# Patient Record
Sex: FEMALE | Race: OTHER | HISPANIC OR LATINO
[De-identification: names, ages, dates, MRNs, and addresses within clinical notes are randomized per-mention and may not be internally consistent; named-entity substitution may affect disease eponyms.]

---

## 2018-12-26 ENCOUNTER — ASOB RESULT (OUTPATIENT)
Age: 43
End: 2018-12-26

## 2018-12-26 ENCOUNTER — APPOINTMENT (OUTPATIENT)
Dept: ANTEPARTUM | Facility: CLINIC | Age: 43
End: 2018-12-26
Payer: MEDICAID

## 2018-12-26 PROBLEM — Z00.00 ENCOUNTER FOR PREVENTIVE HEALTH EXAMINATION: Status: ACTIVE | Noted: 2018-12-26

## 2018-12-26 PROCEDURE — 76801 OB US < 14 WKS SINGLE FETUS: CPT

## 2019-03-13 ENCOUNTER — APPOINTMENT (OUTPATIENT)
Dept: ANTEPARTUM | Facility: CLINIC | Age: 44
End: 2019-03-13
Payer: MEDICAID

## 2019-03-13 ENCOUNTER — ASOB RESULT (OUTPATIENT)
Age: 44
End: 2019-03-13

## 2019-03-13 PROCEDURE — 76817 TRANSVAGINAL US OBSTETRIC: CPT

## 2019-03-13 PROCEDURE — 76811 OB US DETAILED SNGL FETUS: CPT

## 2019-03-29 ENCOUNTER — APPOINTMENT (OUTPATIENT)
Dept: ANTEPARTUM | Facility: CLINIC | Age: 44
End: 2019-03-29
Payer: MEDICAID

## 2019-03-29 ENCOUNTER — ASOB RESULT (OUTPATIENT)
Age: 44
End: 2019-03-29

## 2019-03-29 PROCEDURE — 76816 OB US FOLLOW-UP PER FETUS: CPT

## 2019-04-12 ENCOUNTER — APPOINTMENT (OUTPATIENT)
Dept: PEDIATRIC CARDIOLOGY | Facility: CLINIC | Age: 44
End: 2019-04-12
Payer: MEDICAID

## 2019-04-12 DIAGNOSIS — Q99.9 CHROMOSOMAL ABNORMALITY, UNSPECIFIED: ICD-10-CM

## 2019-04-12 PROCEDURE — 76825 ECHO EXAM OF FETAL HEART: CPT

## 2019-04-12 PROCEDURE — 76827 ECHO EXAM OF FETAL HEART: CPT

## 2019-04-12 PROCEDURE — 93325 DOPPLER ECHO COLOR FLOW MAPG: CPT | Mod: 59

## 2019-04-12 PROCEDURE — 99204 OFFICE O/P NEW MOD 45 MIN: CPT | Mod: 25

## 2019-04-12 RX ORDER — PNV/FERROUS SULFATE/FOLIC ACID 27-<0.5MG
TABLET ORAL
Refills: 0 | Status: ACTIVE | COMMUNITY

## 2019-04-15 PROBLEM — Q99.9 CHROMOSOMAL ABNORMALITY: Status: ACTIVE | Noted: 2019-03-29

## 2019-05-10 ENCOUNTER — APPOINTMENT (OUTPATIENT)
Age: 44
End: 2019-05-10
Payer: MEDICAID

## 2019-05-10 ENCOUNTER — ASOB RESULT (OUTPATIENT)
Age: 44
End: 2019-05-10

## 2019-05-10 PROCEDURE — 76816 OB US FOLLOW-UP PER FETUS: CPT

## 2019-05-29 ENCOUNTER — APPOINTMENT (OUTPATIENT)
Dept: MATERNAL FETAL MEDICINE | Facility: CLINIC | Age: 44
End: 2019-05-29
Payer: MEDICAID

## 2019-05-29 ENCOUNTER — APPOINTMENT (OUTPATIENT)
Dept: ANTEPARTUM | Facility: CLINIC | Age: 44
End: 2019-05-29

## 2019-05-29 VITALS
HEART RATE: 92 BPM | OXYGEN SATURATION: 97 % | SYSTOLIC BLOOD PRESSURE: 138 MMHG | HEIGHT: 65 IN | DIASTOLIC BLOOD PRESSURE: 80 MMHG | BODY MASS INDEX: 37.7 KG/M2 | WEIGHT: 226.25 LBS | RESPIRATION RATE: 18 BRPM

## 2019-05-29 DIAGNOSIS — Z86.69 PERSONAL HISTORY OF OTHER DISEASES OF THE NERVOUS SYSTEM AND SENSE ORGANS: ICD-10-CM

## 2019-05-29 DIAGNOSIS — E66.9 OBESITY, UNSPECIFIED: ICD-10-CM

## 2019-05-29 DIAGNOSIS — O35.9XX0 MATERNAL CARE FOR (SUSPECTED) FETAL ABNORMALITY AND DAMAGE, UNSPECIFIED, NOT APPLICABLE OR UNSPECIFIED: ICD-10-CM

## 2019-05-29 DIAGNOSIS — D25.9 MATERNAL CARE FOR BENIGN TUMOR OF CORPUS UTERI, THIRD TRIMESTER: ICD-10-CM

## 2019-05-29 DIAGNOSIS — O34.13 MATERNAL CARE FOR BENIGN TUMOR OF CORPUS UTERI, THIRD TRIMESTER: ICD-10-CM

## 2019-05-29 DIAGNOSIS — Z3A.31 31 WEEKS GESTATION OF PREGNANCY: ICD-10-CM

## 2019-05-29 DIAGNOSIS — O99.213 OBESITY COMPLICATING PREGNANCY, THIRD TRIMESTER: ICD-10-CM

## 2019-05-29 DIAGNOSIS — Z87.19 PERSONAL HISTORY OF OTHER DISEASES OF THE DIGESTIVE SYSTEM: ICD-10-CM

## 2019-05-29 DIAGNOSIS — Z83.79 FAMILY HISTORY OF OTHER DISEASES OF THE DIGESTIVE SYSTEM: ICD-10-CM

## 2019-05-29 DIAGNOSIS — O09.523 SUPERVISION OF ELDERLY MULTIGRAVIDA, THIRD TRIMESTER: ICD-10-CM

## 2019-05-29 PROCEDURE — 99215 OFFICE O/P EST HI 40 MIN: CPT

## 2019-05-29 RX ORDER — OMEPRAZOLE 20 MG/1
20 CAPSULE, DELAYED RELEASE ORAL
Refills: 0 | Status: ACTIVE | COMMUNITY

## 2019-05-29 NOTE — FAMILY HISTORY
[Age 35+ During Pregnancy] : not 35 or over during pregnancy [Reported Family History Of Birth Defects] : no congenital heart defects [Sagar-Sachs Carrier] : no Sagar-Sachs [Family History] : no mental retardation/autism [Reported Family History Of Genetic Disease] : no maternal metabolic disorder

## 2019-05-29 NOTE — ACTIVE PROBLEMS
[Heart Disease] : no heart disease [Hypertension] : no hypertension [Diabetes Mellitus] : no diabetes mellitus [Renal Disease] : no kidney disease, no UTI [Autoimmune Disease] : no autoimmune disease [Neurologic Disorder] : no neurologic disorder, no epilepsy [Hepatic Disorder] : no hepatitis, no liver disease [Depression] : no depression, no post partum depression [Psychiatric Disorders] : no psychiatric disorders [Thrombophlebitis] : no varicosities, no phlebitis [Thyroid Disorder] : no thyroid dysfunction [Trauma] : no trauma/violence [Blood Transfusion (___ Ml)] : no history of blood transfusion

## 2019-05-29 NOTE — PAST MEDICAL HISTORY
[HIV Infection] : no HIV [Herpes Simplex] : no genital herpes [Syphilis] : no syphilis [Chlamydial Infections] : no chlamydia [Exposure To Gonorrhea] : no gonorrhea [Hepatitis, B Virus] : no Hepatitis B [Human Papilloma Virus Infection] : no genital warts [Hepatitis, C Virus] : no Hepatitis C [Trichomoniasis] : no trichomoniasis

## 2019-05-29 NOTE — DISCUSSION/SUMMARY
[FreeTextEntry1] : She is 31 weeks and 3 days gestation by early ultrasound dating.\par \par She has a  BMI > 35.0 and obesity has been associated with a number of maternal complications such as gestational diabetes, pre-eclampsia, thrombophlebitis, labor abnormalities, post-term pregnancies,  delivery, and operative complications. Obesity has been associated with adverse fetal outcomes such as late stillbirth and  deliveries.  Obese women also have a two to three-fold increased incidence in congenital anomalies. There were no major fetal malformations seen during the fetal anatomy ultrasound examination. A fetal echocardiogram was done on 2019 and reported normal fetal cardiac anatomy.\par \par Regarding her gestational diabetes, she has been trying to follow the recommended diabetic diet. She performs fasting and 2 hour postprandial self glucose monitoring. She was advised to stop doing two-hour postprandial glucose determination and to start doing a one hour postprandial glucose determination from the beginning of the meal. My review of her log book from 5/15/19 to 19 revealed 5/13 (39%) elevated fasting glucose values and 19/32 (61%). She appears to be in suboptimal glycemic control. I believed the reason for her poor glycemic control at this time is due to non-adherence to a diabetic diet. I informed her that maintaining euglycemia is the most important factor associated with good  outcomes in pregnancies complicated by gestational diabetes. I told her that poor glucose control may cause fetal macrosomia, shoulder dystocia,  delivery,  respiratory distress syndrome and  metabolic complications such as hypoglycemia and hyperbilirubinemia.  I told her to eat three daily meals with 3 snacks to reduce postprandial glucose fluctuations. I told her that she is at risk for developing gestational hypertension and preeclampsia during the current pregnancy. I also told her that she is at risk for developing type 2 diabetes, metabolic syndrome and cardiovascular disease later in life. I also recommend a 75 gram 2 hour OGTT approximately 6 - 8 weeks postpartum to determine whether she has impaired glucose tolerance or preexisting diabetes not diagnosed prior to the pregnancy. I believe that she needs more nutritional counseling. I made arrangements for her to see our diabetes educator in approximately one week. I ordered a hemoglobin A1c level. She was also scheduled to have a follow up ultrasound examination in approximally one week to evaluate fetal growth.\par \par Regarding her advanced maternal age, she did not have genetic counseling. She told me that she had prenatal screen testing that reported a low risk for fetal chromosomal malformations. She had a fetal anatomy ultrasound examination on 2019 that reported no fetal malformations. She had a fetal echocardiogram on 2019 reported normal fetal cardiac anatomy.\par \par I told her that advanced maternal age has been associated with a higher incidence of gestational diabetes, and preeclampsia /eclampsia. She should be closely monitored for these conditions during pregnancy.  I also told her that advanced maternal age has been associated with an increased risk of stillbirth, and therefore, I recommend delivery during the 39 week of gestation in the event she has not given birth by 39 weeks of gestation. \par \par A small uterine fibroid (2.7 cm) was noted during her ultrasound examinations. I told her that growth of fibroids during pregnancy cannot be predicted. During the third trimester of pregnancy, fibroids usually remain unchanged or decrease in size.  I suggest having serial ultrasounds during pregnancy. The incidence of  delivery is increased in women with a fibroid uterus.  I also told her that uterine fibroids can also degenerate during pregnancy and cause acute abdominal pain, low-grade fever, and leukocytosis.  Management of uterine fibroid degeneration involves the use of analgesics and observation for  labor.   \par \par Her blood pressure today in the office was slightly elevated at 138/80. She denies having headaches epigastric pain and visual disturbances. She stated that 3 days ago she had a headache. She told me that she was diagnosed with migraine headaches approximately 10 years ago. I ordered the usual preeclampsia laboratory tests.\par \par I recommend the Tdap vaccine between 27 and 36 weeks of gestation to prevent pertussis infection in the  infant. She should have serial ultrasounds to evaluate fetal growth and development.  I also suggest fetal surveillance during the third trimester of pregnancy with weekly NSTs or BPPs starting at 36 weeks gestation.  She can also perform daily fetal movement counts as an adjunct to the NSTs or BPPs.\par

## 2019-05-29 NOTE — VITALS
[US Date: ___] : ultrasound performed on [unfilled]. [GA= ___ Weeks] : Results were GA of [unfilled] weeks [GA= ___ Days] : and [unfilled] day(s) [TAIWO by US (date): ___] : The calculated TAIWO by US is [unfilled] [By US] : this is the final TAIWO

## 2019-05-29 NOTE — SURGICAL HISTORY
[Last Pap: ___] : Last Pap: [unfilled] [Fibroids] : no fibroids [Abn Paps] : no abnormal pap smears [Breast Disease] : no breast disease [Cysts] : no cysts [STI's] : no STI's [Infertility] : no infertility [Last Mammo: ___] : Last Mammo: none [OC Use] : no OC use

## 2019-05-29 NOTE — OB HISTORY
[Spontaneous] : Spontaneous conception [Sonogram] : sonogram [Definite:  ___ (Date)] : the last menstrual period was [unfilled] [at ___ wks] : at [unfilled] weeks [Pregnancy History] : patient did not receive anesthesia [___] : no pregnancy complications reported [FreeTextEntry1] : Prenatal records were not available for my review.\par \par She had a one-hour Glucola challenge tests on April 26, 2019 reported a glucose of 180. She had a three-hour glucose tolerance test on May 4, 2019 that reported a fasting glucose of 102, one hour glucose of 200, 2 hour glucose of 199, and three-hour glucose of 175. She was diagnosed with gestational diabetes based on the three-hour GTT glucose results.\par \par

## 2019-06-07 ENCOUNTER — ASOB RESULT (OUTPATIENT)
Age: 44
End: 2019-06-07

## 2019-06-07 ENCOUNTER — APPOINTMENT (OUTPATIENT)
Dept: ANTEPARTUM | Facility: CLINIC | Age: 44
End: 2019-06-07
Payer: MEDICAID

## 2019-06-07 ENCOUNTER — APPOINTMENT (OUTPATIENT)
Dept: MATERNAL FETAL MEDICINE | Facility: CLINIC | Age: 44
End: 2019-06-07
Payer: MEDICAID

## 2019-06-07 VITALS — HEIGHT: 65 IN | BODY MASS INDEX: 37.51 KG/M2 | WEIGHT: 225.13 LBS

## 2019-06-07 PROCEDURE — 76819 FETAL BIOPHYS PROFIL W/O NST: CPT

## 2019-06-07 PROCEDURE — 76816 OB US FOLLOW-UP PER FETUS: CPT

## 2019-06-07 PROCEDURE — G0108 DIAB MANAGE TRN  PER INDIV: CPT

## 2019-06-14 ENCOUNTER — ASOB RESULT (OUTPATIENT)
Age: 44
End: 2019-06-14

## 2019-06-14 ENCOUNTER — APPOINTMENT (OUTPATIENT)
Dept: MATERNAL FETAL MEDICINE | Facility: CLINIC | Age: 44
End: 2019-06-14
Payer: MEDICAID

## 2019-06-14 VITALS — BODY MASS INDEX: 37.84 KG/M2 | WEIGHT: 227.13 LBS | HEIGHT: 65 IN

## 2019-06-14 DIAGNOSIS — O24.410 GESTATIONAL DIABETES MELLITUS IN PREGNANCY, DIET CONTROLLED: ICD-10-CM

## 2019-06-14 DIAGNOSIS — O24.419 GESTATIONAL DIABETES MELLITUS IN PREGNANCY, UNSPECIFIED CONTROL: ICD-10-CM

## 2019-06-14 PROCEDURE — G0108 DIAB MANAGE TRN  PER INDIV: CPT

## 2019-06-14 RX ORDER — METFORMIN HYDROCHLORIDE 500 MG/1
500 TABLET, COATED ORAL
Qty: 1 | Refills: 1 | Status: ACTIVE | COMMUNITY
Start: 2019-06-14 | End: 1900-01-01

## 2019-06-17 LAB
ALBUMIN SERPL ELPH-MCNC: 3.1 G/DL
ALP BLD-CCNC: 202 U/L
ALT SERPL-CCNC: 13 U/L
AST SERPL-CCNC: 15 U/L
BILIRUB DIRECT SERPL-MCNC: 0.1 MG/DL
BILIRUB INDIRECT SERPL-MCNC: 0.2 MG/DL
BILIRUB SERPL-MCNC: 0.3 MG/DL
ESTIMATED AVERAGE GLUCOSE: 140 MG/DL
HBA1C MFR BLD HPLC: 6.5 %
PROT SERPL-MCNC: 5.9 G/DL

## 2019-06-21 ENCOUNTER — APPOINTMENT (OUTPATIENT)
Dept: ANTEPARTUM | Facility: CLINIC | Age: 44
End: 2019-06-21
Payer: MEDICAID

## 2019-06-21 ENCOUNTER — ASOB RESULT (OUTPATIENT)
Age: 44
End: 2019-06-21

## 2019-06-21 PROCEDURE — 76816 OB US FOLLOW-UP PER FETUS: CPT

## 2019-06-21 PROCEDURE — 76818 FETAL BIOPHYS PROFILE W/NST: CPT

## 2019-06-25 ENCOUNTER — TRANSCRIPTION ENCOUNTER (OUTPATIENT)
Age: 44
End: 2019-06-25

## 2019-06-25 ENCOUNTER — INPATIENT (INPATIENT)
Facility: HOSPITAL | Age: 44
LOS: 4 days | Discharge: HOME | End: 2019-06-30
Attending: SPECIALIST | Admitting: SPECIALIST
Payer: COMMERCIAL

## 2019-06-25 VITALS
TEMPERATURE: 99 F | HEART RATE: 89 BPM | DIASTOLIC BLOOD PRESSURE: 84 MMHG | SYSTOLIC BLOOD PRESSURE: 142 MMHG | RESPIRATION RATE: 16 BRPM

## 2019-06-25 DIAGNOSIS — O47.02 FALSE LABOR BEFORE 37 COMPLETED WEEKS OF GESTATION, SECOND TRIMESTER: ICD-10-CM

## 2019-06-25 DIAGNOSIS — O26.893 OTHER SPECIFIED PREGNANCY RELATED CONDITIONS, THIRD TRIMESTER: ICD-10-CM

## 2019-06-25 LAB
ALBUMIN SERPL ELPH-MCNC: 3.1 G/DL — LOW (ref 3.3–5.2)
ALP SERPL-CCNC: 239 U/L — HIGH (ref 40–120)
ALT FLD-CCNC: 11 U/L — SIGNIFICANT CHANGE UP
ANION GAP SERPL CALC-SCNC: 15 MMOL/L — SIGNIFICANT CHANGE UP (ref 5–17)
APPEARANCE UR: CLEAR — SIGNIFICANT CHANGE UP
APTT BLD: 28.3 SEC — SIGNIFICANT CHANGE UP (ref 27.5–36.3)
AST SERPL-CCNC: 17 U/L — SIGNIFICANT CHANGE UP
BACTERIA # UR AUTO: ABNORMAL
BASOPHILS # BLD AUTO: 0.05 K/UL — SIGNIFICANT CHANGE UP (ref 0–0.2)
BASOPHILS NFR BLD AUTO: 0.5 % — SIGNIFICANT CHANGE UP (ref 0–2)
BILIRUB SERPL-MCNC: 0.4 MG/DL — SIGNIFICANT CHANGE UP (ref 0.4–2)
BILIRUB UR-MCNC: NEGATIVE — SIGNIFICANT CHANGE UP
BLD GP AB SCN SERPL QL: SIGNIFICANT CHANGE UP
BUN SERPL-MCNC: 12 MG/DL — SIGNIFICANT CHANGE UP (ref 8–20)
CALCIUM SERPL-MCNC: 9.8 MG/DL — SIGNIFICANT CHANGE UP (ref 8.6–10.2)
CHLORIDE SERPL-SCNC: 104 MMOL/L — SIGNIFICANT CHANGE UP (ref 98–107)
CO2 SERPL-SCNC: 18 MMOL/L — LOW (ref 22–29)
COLOR SPEC: YELLOW — SIGNIFICANT CHANGE UP
CREAT ?TM UR-MCNC: 67 MG/DL — SIGNIFICANT CHANGE UP
CREAT SERPL-MCNC: 0.85 MG/DL — SIGNIFICANT CHANGE UP (ref 0.5–1.3)
DIFF PNL FLD: ABNORMAL
EOSINOPHIL # BLD AUTO: 0.28 K/UL — SIGNIFICANT CHANGE UP (ref 0–0.5)
EOSINOPHIL NFR BLD AUTO: 3.1 % — SIGNIFICANT CHANGE UP (ref 0–6)
EPI CELLS # UR: SIGNIFICANT CHANGE UP
FIBRINOGEN PPP-MCNC: 802 MG/DL — CRITICAL HIGH (ref 350–510)
GLUCOSE SERPL-MCNC: 86 MG/DL — SIGNIFICANT CHANGE UP (ref 70–115)
GLUCOSE UR QL: NEGATIVE MG/DL — SIGNIFICANT CHANGE UP
HCT VFR BLD CALC: 35.5 % — SIGNIFICANT CHANGE UP (ref 34.5–45)
HGB BLD-MCNC: 11.6 G/DL — SIGNIFICANT CHANGE UP (ref 11.5–15.5)
HIV 1 & 2 AB SERPL IA.RAPID: SIGNIFICANT CHANGE UP
HYALINE CASTS # UR AUTO: ABNORMAL /LPF
IMM GRANULOCYTES NFR BLD AUTO: 1 % — SIGNIFICANT CHANGE UP (ref 0–1.5)
INR BLD: 0.91 RATIO — SIGNIFICANT CHANGE UP (ref 0.88–1.16)
KETONES UR-MCNC: NEGATIVE — SIGNIFICANT CHANGE UP
LDH SERPL L TO P-CCNC: 233 U/L — HIGH (ref 98–192)
LEUKOCYTE ESTERASE UR-ACNC: NEGATIVE — SIGNIFICANT CHANGE UP
LYMPHOCYTES # BLD AUTO: 2.78 K/UL — SIGNIFICANT CHANGE UP (ref 1–3.3)
LYMPHOCYTES # BLD AUTO: 30.4 % — SIGNIFICANT CHANGE UP (ref 13–44)
MCHC RBC-ENTMCNC: 27.4 PG — SIGNIFICANT CHANGE UP (ref 27–34)
MCHC RBC-ENTMCNC: 32.7 GM/DL — SIGNIFICANT CHANGE UP (ref 32–36)
MCV RBC AUTO: 83.9 FL — SIGNIFICANT CHANGE UP (ref 80–100)
MONOCYTES # BLD AUTO: 0.68 K/UL — SIGNIFICANT CHANGE UP (ref 0–0.9)
MONOCYTES NFR BLD AUTO: 7.4 % — SIGNIFICANT CHANGE UP (ref 2–14)
NEUTROPHILS # BLD AUTO: 5.25 K/UL — SIGNIFICANT CHANGE UP (ref 1.8–7.4)
NEUTROPHILS NFR BLD AUTO: 57.6 % — SIGNIFICANT CHANGE UP (ref 43–77)
NITRITE UR-MCNC: NEGATIVE — SIGNIFICANT CHANGE UP
PH UR: 6 — SIGNIFICANT CHANGE UP (ref 5–8)
PLATELET # BLD AUTO: 186 K/UL — SIGNIFICANT CHANGE UP (ref 150–400)
POTASSIUM SERPL-MCNC: 4 MMOL/L — SIGNIFICANT CHANGE UP (ref 3.5–5.3)
POTASSIUM SERPL-SCNC: 4 MMOL/L — SIGNIFICANT CHANGE UP (ref 3.5–5.3)
PROT ?TM UR-MCNC: 221 MG/DL — HIGH (ref 0–12)
PROT SERPL-MCNC: 7 G/DL — SIGNIFICANT CHANGE UP (ref 6.6–8.7)
PROT UR-MCNC: 100 MG/DL
PROT/CREAT UR-RTO: 3.3 RATIO — HIGH
PROTHROM AB SERPL-ACNC: 10.4 SEC — SIGNIFICANT CHANGE UP (ref 10–12.9)
RBC # BLD: 4.23 M/UL — SIGNIFICANT CHANGE UP (ref 3.8–5.2)
RBC # FLD: 13.7 % — SIGNIFICANT CHANGE UP (ref 10.3–14.5)
RBC CASTS # UR COMP ASSIST: SIGNIFICANT CHANGE UP /HPF (ref 0–4)
SODIUM SERPL-SCNC: 137 MMOL/L — SIGNIFICANT CHANGE UP (ref 135–145)
SP GR SPEC: 1.01 — SIGNIFICANT CHANGE UP (ref 1.01–1.02)
TYPE + AB SCN PNL BLD: SIGNIFICANT CHANGE UP
URATE SERPL-MCNC: 6.3 MG/DL — HIGH (ref 2.4–5.7)
UROBILINOGEN FLD QL: NEGATIVE MG/DL — SIGNIFICANT CHANGE UP
WBC # BLD: 9.13 K/UL — SIGNIFICANT CHANGE UP (ref 3.8–10.5)
WBC # FLD AUTO: 9.13 K/UL — SIGNIFICANT CHANGE UP (ref 3.8–10.5)
WBC UR QL: SIGNIFICANT CHANGE UP

## 2019-06-25 RX ORDER — AMPICILLIN TRIHYDRATE 250 MG
2 CAPSULE ORAL ONCE
Refills: 0 | Status: COMPLETED | OUTPATIENT
Start: 2019-06-25 | End: 2019-06-25

## 2019-06-25 RX ORDER — MAGNESIUM SULFATE 500 MG/ML
4 VIAL (ML) INJECTION ONCE
Refills: 0 | Status: COMPLETED | OUTPATIENT
Start: 2019-06-25 | End: 2019-06-25

## 2019-06-25 RX ORDER — CITRIC ACID/SODIUM CITRATE 300-500 MG
30 SOLUTION, ORAL ORAL ONCE
Refills: 0 | Status: DISCONTINUED | OUTPATIENT
Start: 2019-06-25 | End: 2019-06-26

## 2019-06-25 RX ORDER — SODIUM CHLORIDE 9 MG/ML
1000 INJECTION, SOLUTION INTRAVENOUS
Refills: 0 | Status: DISCONTINUED | OUTPATIENT
Start: 2019-06-25 | End: 2019-06-27

## 2019-06-25 RX ORDER — OXYTOCIN 10 UNIT/ML
333.33 VIAL (ML) INJECTION
Qty: 20 | Refills: 0 | Status: DISCONTINUED | OUTPATIENT
Start: 2019-06-25 | End: 2019-06-26

## 2019-06-25 RX ORDER — AMPICILLIN TRIHYDRATE 250 MG
1 CAPSULE ORAL EVERY 4 HOURS
Refills: 0 | Status: DISCONTINUED | OUTPATIENT
Start: 2019-06-25 | End: 2019-06-26

## 2019-06-25 RX ORDER — LABETALOL HCL 100 MG
20 TABLET ORAL ONCE
Refills: 0 | Status: DISCONTINUED | OUTPATIENT
Start: 2019-06-25 | End: 2019-06-26

## 2019-06-25 RX ORDER — MAGNESIUM SULFATE 500 MG/ML
2 VIAL (ML) INJECTION
Qty: 40 | Refills: 0 | Status: DISCONTINUED | OUTPATIENT
Start: 2019-06-25 | End: 2019-06-27

## 2019-06-25 RX ADMIN — Medication 50 GM/HR: at 20:40

## 2019-06-25 RX ADMIN — Medication 100 GRAM(S): at 20:19

## 2019-06-25 RX ADMIN — Medication 216 GRAM(S): at 21:11

## 2019-06-25 NOTE — OB PROVIDER TRIAGE NOTE - HISTORY OF PRESENT ILLNESS
45 y/o  GDM2 (on glyburide)@ 35w2d here b/c sent over from [facility] clinic for two separate elevated blood pressure readings. (141/91 and 148/96. Pt also states she had some blurry vision, and headache in the office. No hx of preeclampsia or GDM in previous pregnancy. Pts sugars have been well controlled in this pregnancy. No loss of fluid, vaginal bleeding, ROS otherwise negative.

## 2019-06-25 NOTE — OB PROVIDER H&P - ASSESSMENT
45 y/o  at 35w2d admitted for IOL 2/2 pre-eclampsia with severe features based on high blood pressures, proteinuria, and unexplained CNS symptoms (headache, blurry vision).   - consent  - admission labs  - Mg infusion therapy   - GBS prophylaxis for GBS unknown and    - Cytotec PO     d/w Dr. Bernal 43 y/o  at 35w2d admitted for IOL 2/2 pre-eclampsia with severe features based on high blood pressures, proteinuria, and unexplained CNS symptoms (headache, blurry vision).   - consent  - admission labs  - Mg infusion therapy   - GBS prophylaxis for GBS unknown and    - Cytotec PO     d/w Dr. Bernal   ATTENDING NOTE  Patient was seen and evaluated  at beside with resident Dr. Bocanegra at approx. 8 pm.  This was my first encounter with the patient.  FHT Cat 1  irregular contractions. beside sono done vertex presenting fetus.  I agree with above assessment and plan.    Close monitoring of BP  Strick I & O  Venodyne boots for DVT prophylaxis    GLENIS Bernal MD 45 y/o  at 35w2d admitted for IOL 2/2 pre-eclampsia with severe features based on high blood pressures, proteinuria, and unexplained CNS symptoms (headache, blurry vision).   - consent  - admission labs  - FSG q4h for GDMA2  - EFW 2800  - Mg infusion therapy   - GBS prophylaxis for GBS unknown and    - Cytotec PO     d/w Dr. Bernal   consulted with Dr. Shepherd    ATTENDING NOTE  Patient was seen and evaluated  at beside with resident Dr. Bocanegra at approx. 8 pm.  This was my first encounter with the patient.  FHT Cat 1  irregular contractions. beside sono done vertex presenting fetus.  I agree with above assessment and plan.    Close monitoring of BP  Strick I & O  Venodyne boots for DVT prophylaxis    GLENIS Bernal MD

## 2019-06-25 NOTE — CHART NOTE - NSCHARTNOTEFT_GEN_A_CORE
Patient met protocol, had two severe range blood pressures within an hour.   Labetalol 20mg was drawn but witheld because the following 2 blood pressures after the second severe range blood pressure were 146/79 and 146/84. HR is 85, no history of asthma or cardiac disease.   Will continue cycling blood pressures and push IV medication if she meets criteria again.     d/w Dr. Bernal Patient met protocol, had two severe range blood pressures within an hour.   Labetalol 20mg was drawn but witheld because the following 2 blood pressures after the second severe range blood pressure were 146/79 and 146/84. HR is 85, no history of asthma or cardiac disease.   Will continue cycling blood pressures and push IV medication if she meets criteria again.     d/w Dr. Bernal  ATTENDING NOTE  I was at patient's bedside. I agree with above resident note.    GLENIS Bernal MD

## 2019-06-25 NOTE — OB PROVIDER TRIAGE NOTE - NSOBPROVIDERNOTE_OBGYN_ALL_OB_FT
45 y/o  GDM2 (on glyburide)@ 35w2d here b/c sent over from [facility] clinic for two separate elevated blood pressure readings.    PIH labs ordered.   Closely monitor  Review tracing.     Discussed with Dr. Sriavstava, ob nurse and supporting staff. 45 y/o  GDM2 (on glyburide)@ 35w2d here b/c sent over from [facility] clinic for two separate elevated blood pressure readings.    PIH labs ordered.   Closely monitor  Review tracing.     Discussed with Dr. Srivastava, ob nurse and supporting staff.    OB/GYN hospitalist covering [facility]:   patient here for PIH evaluation, stable  labs pending  FHRT reassuring  Taylor Srivastava MD

## 2019-06-25 NOTE — OB PROVIDER TRIAGE NOTE - NSHPPHYSICALEXAM_GEN_ALL_CORE
Vital Signs Last 24 Hrs  HR: 89 (25 Jun 2019 16:59) (89 - 89)  BP: 142/84 (25 Jun 2019 16:59) (142/84 - 142/84)    Physical exam:  Gen: NAD  Abd: soft non tender    Labs pending

## 2019-06-25 NOTE — OB PROVIDER H&P - NSHPPHYSICALEXAM_GEN_ALL_CORE
Vital Signs Last 24 Hrs  T(C): 37.1 (25 Jun 2019 16:47), Max: 37.1 (25 Jun 2019 16:47)  T(F): 98.7 (25 Jun 2019 16:47), Max: 98.7 (25 Jun 2019 16:47)  HR: 83 (25 Jun 2019 20:10) (78 - 89)  BP: 190/97 (25 Jun 2019 20:10) (131/77 - 190/97)  BP(mean): --  RR: 16 (25 Jun 2019 16:47) (16 - 16)  SpO2: --    FHT: baseline 150, moderate variability, +accels, no decels  [place]: merle irregularly and infrequently

## 2019-06-25 NOTE — OB RN TRIAGE NOTE - NS_TRIAGEADDITIONAL COMMENTS_OBGYN_ALL_OB_FT
IV started and preeclamptic bloodwork sent. BP set to monitor every 15 min. Pt denies epigastric pain, visual disturbances, or swelling of hands feet or face. Pt states that she has issues with her allergies and is taking afrin, and zyrtec. Pt states that she has been having occasional headaches throughout the day. IV started and preeclamptic bloodwork sent. BP set to monitor every 15 min. Pt denies epigastric pain, visual disturbances, or swelling of hands feet or face. Pt states that she has issues with her allergies and is taking afrin, and zyrtec. Pt states that she has been having occasional headaches throughout the day. Pt admitted for induction of labor for preeclamsia

## 2019-06-25 NOTE — OB PROVIDER H&P - HISTORY OF PRESENT ILLNESS
45 y/o  at 35w2d sent from [facility] clinic for two separate elevated blood pressure readings. (141/91 and 148/96. Pt also states she had some blurry vision, and headache in the office. No hx of preeclampsia or GDM in previous pregnancy. Pts sugars have been well controlled in this pregnancy. No loss of fluid, vaginal bleeding, ROS otherwise negative. +FM  Prenatal course significant for GDMA2 and AMA.     ObGynHx: VD x1 (, full term, uncomplicated)  PMH/PSH: wrist surgery in   Meds: PNV, Glyburide 2.5mg HS    Allergies: NKDA

## 2019-06-26 LAB
ABO RH CONFIRMATION: SIGNIFICANT CHANGE UP
ALBUMIN SERPL ELPH-MCNC: 2.9 G/DL — LOW (ref 3.3–5.2)
ALBUMIN SERPL ELPH-MCNC: 3 G/DL — LOW (ref 3.3–5.2)
ALP SERPL-CCNC: 229 U/L — HIGH (ref 40–120)
ALP SERPL-CCNC: 233 U/L — HIGH (ref 40–120)
ALT FLD-CCNC: 11 U/L — SIGNIFICANT CHANGE UP
ALT FLD-CCNC: 11 U/L — SIGNIFICANT CHANGE UP
ANION GAP SERPL CALC-SCNC: 16 MMOL/L — SIGNIFICANT CHANGE UP (ref 5–17)
ANION GAP SERPL CALC-SCNC: 17 MMOL/L — SIGNIFICANT CHANGE UP (ref 5–17)
APPEARANCE UR: CLEAR — SIGNIFICANT CHANGE UP
APTT BLD: 25.9 SEC — LOW (ref 27.5–36.3)
APTT BLD: 26.6 SEC — LOW (ref 27.5–36.3)
AST SERPL-CCNC: 15 U/L — SIGNIFICANT CHANGE UP
AST SERPL-CCNC: 26 U/L — SIGNIFICANT CHANGE UP
BACTERIA # UR AUTO: ABNORMAL
BASOPHILS # BLD AUTO: 0.03 K/UL — SIGNIFICANT CHANGE UP (ref 0–0.2)
BASOPHILS NFR BLD AUTO: 0.2 % — SIGNIFICANT CHANGE UP (ref 0–2)
BASOPHILS NFR BLD AUTO: 0.3 % — SIGNIFICANT CHANGE UP (ref 0–2)
BASOPHILS NFR BLD AUTO: 0.3 % — SIGNIFICANT CHANGE UP (ref 0–2)
BILIRUB SERPL-MCNC: 0.5 MG/DL — SIGNIFICANT CHANGE UP (ref 0.4–2)
BILIRUB SERPL-MCNC: 0.6 MG/DL — SIGNIFICANT CHANGE UP (ref 0.4–2)
BILIRUB UR-MCNC: NEGATIVE — SIGNIFICANT CHANGE UP
BUN SERPL-MCNC: 7 MG/DL — LOW (ref 8–20)
BUN SERPL-MCNC: 9 MG/DL — SIGNIFICANT CHANGE UP (ref 8–20)
CALCIUM SERPL-MCNC: 7.6 MG/DL — LOW (ref 8.6–10.2)
CALCIUM SERPL-MCNC: 8.5 MG/DL — LOW (ref 8.6–10.2)
CHLORIDE SERPL-SCNC: 101 MMOL/L — SIGNIFICANT CHANGE UP (ref 98–107)
CHLORIDE SERPL-SCNC: 95 MMOL/L — LOW (ref 98–107)
CO2 SERPL-SCNC: 18 MMOL/L — LOW (ref 22–29)
CO2 SERPL-SCNC: 18 MMOL/L — LOW (ref 22–29)
COLOR SPEC: YELLOW — SIGNIFICANT CHANGE UP
CREAT ?TM UR-MCNC: 28 MG/DL — SIGNIFICANT CHANGE UP
CREAT SERPL-MCNC: 0.69 MG/DL — SIGNIFICANT CHANGE UP (ref 0.5–1.3)
CREAT SERPL-MCNC: 0.77 MG/DL — SIGNIFICANT CHANGE UP (ref 0.5–1.3)
DIFF PNL FLD: ABNORMAL
EOSINOPHIL # BLD AUTO: 0 K/UL — SIGNIFICANT CHANGE UP (ref 0–0.5)
EOSINOPHIL # BLD AUTO: 0.11 K/UL — SIGNIFICANT CHANGE UP (ref 0–0.5)
EOSINOPHIL # BLD AUTO: 0.21 K/UL — SIGNIFICANT CHANGE UP (ref 0–0.5)
EOSINOPHIL NFR BLD AUTO: 0 % — SIGNIFICANT CHANGE UP (ref 0–6)
EOSINOPHIL NFR BLD AUTO: 1.1 % — SIGNIFICANT CHANGE UP (ref 0–6)
EOSINOPHIL NFR BLD AUTO: 2.1 % — SIGNIFICANT CHANGE UP (ref 0–6)
EPI CELLS # UR: SIGNIFICANT CHANGE UP
FIBRINOGEN PPP-MCNC: 694 MG/DL — HIGH (ref 350–510)
FIBRINOGEN PPP-MCNC: 840 MG/DL — CRITICAL HIGH (ref 350–510)
GLUCOSE BLDC GLUCOMTR-MCNC: 74 MG/DL — SIGNIFICANT CHANGE UP (ref 70–99)
GLUCOSE BLDC GLUCOMTR-MCNC: 75 MG/DL — SIGNIFICANT CHANGE UP (ref 70–99)
GLUCOSE SERPL-MCNC: 146 MG/DL — HIGH (ref 70–115)
GLUCOSE SERPL-MCNC: 86 MG/DL — SIGNIFICANT CHANGE UP (ref 70–115)
GLUCOSE UR QL: NEGATIVE MG/DL — SIGNIFICANT CHANGE UP
HCT VFR BLD CALC: 30.1 % — LOW (ref 34.5–45)
HCT VFR BLD CALC: 34.6 % — SIGNIFICANT CHANGE UP (ref 34.5–45)
HCT VFR BLD CALC: 34.8 % — SIGNIFICANT CHANGE UP (ref 34.5–45)
HGB BLD-MCNC: 11.3 G/DL — LOW (ref 11.5–15.5)
HGB BLD-MCNC: 11.4 G/DL — LOW (ref 11.5–15.5)
HGB BLD-MCNC: 9.7 G/DL — LOW (ref 11.5–15.5)
HIV 1+2 AB+HIV1 P24 AG SERPL QL IA: SIGNIFICANT CHANGE UP
IMM GRANULOCYTES NFR BLD AUTO: 0.5 % — SIGNIFICANT CHANGE UP (ref 0–1.5)
IMM GRANULOCYTES NFR BLD AUTO: 0.8 % — SIGNIFICANT CHANGE UP (ref 0–1.5)
IMM GRANULOCYTES NFR BLD AUTO: 1.4 % — SIGNIFICANT CHANGE UP (ref 0–1.5)
INR BLD: 0.89 RATIO — SIGNIFICANT CHANGE UP (ref 0.88–1.16)
INR BLD: 0.91 RATIO — SIGNIFICANT CHANGE UP (ref 0.88–1.16)
KETONES UR-MCNC: ABNORMAL
LDH SERPL L TO P-CCNC: 220 U/L — HIGH (ref 98–192)
LDH SERPL L TO P-CCNC: 376 U/L — HIGH (ref 98–192)
LEUKOCYTE ESTERASE UR-ACNC: NEGATIVE — SIGNIFICANT CHANGE UP
LYMPHOCYTES # BLD AUTO: 1.34 K/UL — SIGNIFICANT CHANGE UP (ref 1–3.3)
LYMPHOCYTES # BLD AUTO: 1.75 K/UL — SIGNIFICANT CHANGE UP (ref 1–3.3)
LYMPHOCYTES # BLD AUTO: 17.5 % — SIGNIFICANT CHANGE UP (ref 13–44)
LYMPHOCYTES # BLD AUTO: 2.5 K/UL — SIGNIFICANT CHANGE UP (ref 1–3.3)
LYMPHOCYTES # BLD AUTO: 25.6 % — SIGNIFICANT CHANGE UP (ref 13–44)
LYMPHOCYTES # BLD AUTO: 8.7 % — LOW (ref 13–44)
MAGNESIUM SERPL-MCNC: 5.2 MG/DL — HIGH (ref 1.6–2.6)
MAGNESIUM SERPL-MCNC: 5.9 MG/DL — HIGH (ref 1.6–2.6)
MAGNESIUM SERPL-MCNC: 6.3 MG/DL — HIGH (ref 1.6–2.6)
MCHC RBC-ENTMCNC: 27.6 PG — SIGNIFICANT CHANGE UP (ref 27–34)
MCHC RBC-ENTMCNC: 27.6 PG — SIGNIFICANT CHANGE UP (ref 27–34)
MCHC RBC-ENTMCNC: 27.7 PG — SIGNIFICANT CHANGE UP (ref 27–34)
MCHC RBC-ENTMCNC: 32.2 GM/DL — SIGNIFICANT CHANGE UP (ref 32–36)
MCHC RBC-ENTMCNC: 32.7 GM/DL — SIGNIFICANT CHANGE UP (ref 32–36)
MCHC RBC-ENTMCNC: 32.8 GM/DL — SIGNIFICANT CHANGE UP (ref 32–36)
MCV RBC AUTO: 84.4 FL — SIGNIFICANT CHANGE UP (ref 80–100)
MCV RBC AUTO: 84.5 FL — SIGNIFICANT CHANGE UP (ref 80–100)
MCV RBC AUTO: 85.8 FL — SIGNIFICANT CHANGE UP (ref 80–100)
MONOCYTES # BLD AUTO: 0.5 K/UL — SIGNIFICANT CHANGE UP (ref 0–0.9)
MONOCYTES # BLD AUTO: 0.72 K/UL — SIGNIFICANT CHANGE UP (ref 0–0.9)
MONOCYTES # BLD AUTO: 0.95 K/UL — HIGH (ref 0–0.9)
MONOCYTES NFR BLD AUTO: 5 % — SIGNIFICANT CHANGE UP (ref 2–14)
MONOCYTES NFR BLD AUTO: 6.2 % — SIGNIFICANT CHANGE UP (ref 2–14)
MONOCYTES NFR BLD AUTO: 7.4 % — SIGNIFICANT CHANGE UP (ref 2–14)
NEUTROPHILS # BLD AUTO: 12.82 K/UL — HIGH (ref 1.8–7.4)
NEUTROPHILS # BLD AUTO: 6.27 K/UL — SIGNIFICANT CHANGE UP (ref 1.8–7.4)
NEUTROPHILS # BLD AUTO: 7.51 K/UL — HIGH (ref 1.8–7.4)
NEUTROPHILS NFR BLD AUTO: 64.1 % — SIGNIFICANT CHANGE UP (ref 43–77)
NEUTROPHILS NFR BLD AUTO: 75.3 % — SIGNIFICANT CHANGE UP (ref 43–77)
NEUTROPHILS NFR BLD AUTO: 83.5 % — HIGH (ref 43–77)
NITRITE UR-MCNC: NEGATIVE — SIGNIFICANT CHANGE UP
PH UR: 6 — SIGNIFICANT CHANGE UP (ref 5–8)
PLATELET # BLD AUTO: 150 K/UL — SIGNIFICANT CHANGE UP (ref 150–400)
PLATELET # BLD AUTO: 167 K/UL — SIGNIFICANT CHANGE UP (ref 150–400)
PLATELET # BLD AUTO: 177 K/UL — SIGNIFICANT CHANGE UP (ref 150–400)
POTASSIUM SERPL-MCNC: 3.9 MMOL/L — SIGNIFICANT CHANGE UP (ref 3.5–5.3)
POTASSIUM SERPL-MCNC: 3.9 MMOL/L — SIGNIFICANT CHANGE UP (ref 3.5–5.3)
POTASSIUM SERPL-SCNC: 3.9 MMOL/L — SIGNIFICANT CHANGE UP (ref 3.5–5.3)
POTASSIUM SERPL-SCNC: 3.9 MMOL/L — SIGNIFICANT CHANGE UP (ref 3.5–5.3)
PROT ?TM UR-MCNC: 84 MG/DL — HIGH (ref 0–12)
PROT SERPL-MCNC: 6.4 G/DL — LOW (ref 6.6–8.7)
PROT SERPL-MCNC: 6.5 G/DL — LOW (ref 6.6–8.7)
PROT UR-MCNC: 100 MG/DL
PROT/CREAT UR-RTO: 3 RATIO — HIGH
PROTHROM AB SERPL-ACNC: 10.2 SEC — SIGNIFICANT CHANGE UP (ref 10–12.9)
PROTHROM AB SERPL-ACNC: 10.4 SEC — SIGNIFICANT CHANGE UP (ref 10–12.9)
RBC # BLD: 3.51 M/UL — LOW (ref 3.8–5.2)
RBC # BLD: 4.1 M/UL — SIGNIFICANT CHANGE UP (ref 3.8–5.2)
RBC # BLD: 4.12 M/UL — SIGNIFICANT CHANGE UP (ref 3.8–5.2)
RBC # FLD: 13.9 % — SIGNIFICANT CHANGE UP (ref 10.3–14.5)
RBC # FLD: 14 % — SIGNIFICANT CHANGE UP (ref 10.3–14.5)
RBC # FLD: 14.1 % — SIGNIFICANT CHANGE UP (ref 10.3–14.5)
RBC CASTS # UR COMP ASSIST: >50 /HPF (ref 0–4)
SODIUM SERPL-SCNC: 130 MMOL/L — LOW (ref 135–145)
SODIUM SERPL-SCNC: 135 MMOL/L — SIGNIFICANT CHANGE UP (ref 135–145)
SP GR SPEC: 1.01 — SIGNIFICANT CHANGE UP (ref 1.01–1.02)
URATE SERPL-MCNC: 6.2 MG/DL — HIGH (ref 2.4–5.7)
URATE SERPL-MCNC: 6.2 MG/DL — HIGH (ref 2.4–5.7)
UROBILINOGEN FLD QL: NEGATIVE MG/DL — SIGNIFICANT CHANGE UP
WBC # BLD: 15.35 K/UL — HIGH (ref 3.8–10.5)
WBC # BLD: 9.78 K/UL — SIGNIFICANT CHANGE UP (ref 3.8–10.5)
WBC # BLD: 9.98 K/UL — SIGNIFICANT CHANGE UP (ref 3.8–10.5)
WBC # FLD AUTO: 15.35 K/UL — HIGH (ref 3.8–10.5)
WBC # FLD AUTO: 9.78 K/UL — SIGNIFICANT CHANGE UP (ref 3.8–10.5)
WBC # FLD AUTO: 9.98 K/UL — SIGNIFICANT CHANGE UP (ref 3.8–10.5)
WBC UR QL: SIGNIFICANT CHANGE UP

## 2019-06-26 RX ORDER — OXYCODONE HYDROCHLORIDE 5 MG/1
5 TABLET ORAL ONCE
Refills: 0 | Status: DISCONTINUED | OUTPATIENT
Start: 2019-06-27 | End: 2019-06-30

## 2019-06-26 RX ORDER — HYDRALAZINE HCL 50 MG
5 TABLET ORAL ONCE
Refills: 0 | Status: COMPLETED | OUTPATIENT
Start: 2019-06-26 | End: 2019-06-26

## 2019-06-26 RX ORDER — TETANUS TOXOID, REDUCED DIPHTHERIA TOXOID AND ACELLULAR PERTUSSIS VACCINE, ADSORBED 5; 2.5; 8; 8; 2.5 [IU]/.5ML; [IU]/.5ML; UG/.5ML; UG/.5ML; UG/.5ML
0.5 SUSPENSION INTRAMUSCULAR ONCE
Refills: 0 | Status: DISCONTINUED | OUTPATIENT
Start: 2019-06-26 | End: 2019-06-30

## 2019-06-26 RX ORDER — DIPHENHYDRAMINE HCL 50 MG
25 CAPSULE ORAL EVERY 6 HOURS
Refills: 0 | Status: DISCONTINUED | OUTPATIENT
Start: 2019-06-26 | End: 2019-06-30

## 2019-06-26 RX ORDER — OXYCODONE HYDROCHLORIDE 5 MG/1
10 TABLET ORAL
Refills: 0 | Status: DISCONTINUED | OUTPATIENT
Start: 2019-06-26 | End: 2019-06-30

## 2019-06-26 RX ORDER — SODIUM CHLORIDE 9 MG/ML
1000 INJECTION, SOLUTION INTRAVENOUS ONCE
Refills: 0 | Status: DISCONTINUED | OUTPATIENT
Start: 2019-06-26 | End: 2019-06-26

## 2019-06-26 RX ORDER — LANOLIN
1 OINTMENT (GRAM) TOPICAL EVERY 6 HOURS
Refills: 0 | Status: DISCONTINUED | OUTPATIENT
Start: 2019-06-26 | End: 2019-06-30

## 2019-06-26 RX ORDER — NALOXONE HYDROCHLORIDE 4 MG/.1ML
0.1 SPRAY NASAL
Refills: 0 | Status: DISCONTINUED | OUTPATIENT
Start: 2019-06-26 | End: 2019-06-30

## 2019-06-26 RX ORDER — SODIUM CHLORIDE 9 MG/ML
1000 INJECTION, SOLUTION INTRAVENOUS
Refills: 0 | Status: DISCONTINUED | OUTPATIENT
Start: 2019-06-26 | End: 2019-06-26

## 2019-06-26 RX ORDER — MAGNESIUM HYDROXIDE 400 MG/1
30 TABLET, CHEWABLE ORAL
Refills: 0 | Status: DISCONTINUED | OUTPATIENT
Start: 2019-06-26 | End: 2019-06-30

## 2019-06-26 RX ORDER — KETOROLAC TROMETHAMINE 30 MG/ML
30 SYRINGE (ML) INJECTION EVERY 6 HOURS
Refills: 0 | Status: DISCONTINUED | OUTPATIENT
Start: 2019-06-26 | End: 2019-06-28

## 2019-06-26 RX ORDER — METOCLOPRAMIDE HCL 10 MG
10 TABLET ORAL ONCE
Refills: 0 | Status: COMPLETED | OUTPATIENT
Start: 2019-06-26 | End: 2019-06-26

## 2019-06-26 RX ORDER — DOCUSATE SODIUM 100 MG
100 CAPSULE ORAL
Refills: 0 | Status: DISCONTINUED | OUTPATIENT
Start: 2019-06-26 | End: 2019-06-30

## 2019-06-26 RX ORDER — SIMETHICONE 80 MG/1
80 TABLET, CHEWABLE ORAL EVERY 4 HOURS
Refills: 0 | Status: DISCONTINUED | OUTPATIENT
Start: 2019-06-26 | End: 2019-06-30

## 2019-06-26 RX ORDER — GLYCERIN ADULT
1 SUPPOSITORY, RECTAL RECTAL AT BEDTIME
Refills: 0 | Status: DISCONTINUED | OUTPATIENT
Start: 2019-06-26 | End: 2019-06-30

## 2019-06-26 RX ORDER — OXYTOCIN 10 UNIT/ML
333.33 VIAL (ML) INJECTION
Qty: 20 | Refills: 0 | Status: DISCONTINUED | OUTPATIENT
Start: 2019-06-26 | End: 2019-06-26

## 2019-06-26 RX ORDER — OXYCODONE HYDROCHLORIDE 5 MG/1
5 TABLET ORAL
Refills: 0 | Status: DISCONTINUED | OUTPATIENT
Start: 2019-06-26 | End: 2019-06-30

## 2019-06-26 RX ORDER — OXYTOCIN 10 UNIT/ML
333.33 VIAL (ML) INJECTION
Qty: 20 | Refills: 0 | Status: DISCONTINUED | OUTPATIENT
Start: 2019-06-26 | End: 2019-06-30

## 2019-06-26 RX ORDER — OXYCODONE HYDROCHLORIDE 5 MG/1
5 TABLET ORAL
Refills: 0 | Status: DISCONTINUED | OUTPATIENT
Start: 2019-06-27 | End: 2019-06-30

## 2019-06-26 RX ORDER — FAMOTIDINE 10 MG/ML
20 INJECTION INTRAVENOUS ONCE
Refills: 0 | Status: DISCONTINUED | OUTPATIENT
Start: 2019-06-26 | End: 2019-06-27

## 2019-06-26 RX ORDER — DIPHENHYDRAMINE HCL 50 MG
50 CAPSULE ORAL EVERY 4 HOURS
Refills: 0 | Status: DISCONTINUED | OUTPATIENT
Start: 2019-06-26 | End: 2019-06-30

## 2019-06-26 RX ORDER — ACETAMINOPHEN 500 MG
975 TABLET ORAL
Refills: 0 | Status: DISCONTINUED | OUTPATIENT
Start: 2019-06-27 | End: 2019-06-27

## 2019-06-26 RX ORDER — ONDANSETRON 8 MG/1
4 TABLET, FILM COATED ORAL EVERY 6 HOURS
Refills: 0 | Status: DISCONTINUED | OUTPATIENT
Start: 2019-06-26 | End: 2019-06-30

## 2019-06-26 RX ORDER — CEFAZOLIN SODIUM 1 G
2000 VIAL (EA) INJECTION ONCE
Refills: 0 | Status: COMPLETED | OUTPATIENT
Start: 2019-06-26 | End: 2019-06-26

## 2019-06-26 RX ORDER — ACETAMINOPHEN 500 MG
1000 TABLET ORAL ONCE
Refills: 0 | Status: COMPLETED | OUTPATIENT
Start: 2019-06-26 | End: 2019-06-26

## 2019-06-26 RX ORDER — ACETAMINOPHEN 500 MG
650 TABLET ORAL ONCE
Refills: 0 | Status: COMPLETED | OUTPATIENT
Start: 2019-06-26 | End: 2019-06-26

## 2019-06-26 RX ORDER — CITRIC ACID/SODIUM CITRATE 300-500 MG
30 SOLUTION, ORAL ORAL ONCE
Refills: 0 | Status: DISCONTINUED | OUTPATIENT
Start: 2019-06-26 | End: 2019-06-26

## 2019-06-26 RX ADMIN — Medication 400 MILLIGRAM(S): at 12:32

## 2019-06-26 RX ADMIN — Medication 5 MILLIGRAM(S): at 07:30

## 2019-06-26 RX ADMIN — Medication 650 MILLIGRAM(S): at 07:45

## 2019-06-26 RX ADMIN — SODIUM CHLORIDE 125 MILLILITER(S): 9 INJECTION, SOLUTION INTRAVENOUS at 04:47

## 2019-06-26 RX ADMIN — Medication 108 GRAM(S): at 01:09

## 2019-06-26 RX ADMIN — ONDANSETRON 4 MILLIGRAM(S): 8 TABLET, FILM COATED ORAL at 17:15

## 2019-06-26 RX ADMIN — Medication 650 MILLIGRAM(S): at 08:45

## 2019-06-26 RX ADMIN — Medication 30 MILLIGRAM(S): at 19:04

## 2019-06-26 RX ADMIN — Medication 10 MILLIGRAM(S): at 18:59

## 2019-06-26 RX ADMIN — Medication 100 MILLIGRAM(S): at 09:35

## 2019-06-26 RX ADMIN — Medication 1000 MILLIGRAM(S): at 12:47

## 2019-06-26 RX ADMIN — Medication 108 GRAM(S): at 05:09

## 2019-06-26 NOTE — CHART NOTE - NSCHARTNOTEFT_GEN_A_CORE
Name: KEM CONTRERAS  MRN: [id number]  Date Admitted: 19  Location: [facility]  10 ([facility])  Attending: Taylor Srivastava  All:   Magnesium Progress Note    KEM CONTRERAS is a 44y  s/p pCS POD #0 due to NRFHT after IOL due to GDMA2 and pre-eclampsia with severe featrues who was started on Magnesium Sulfate at 0944.    SUBJECTIVE:  Pt evaluated at bedside for magnesium check.   Denies HA, visual changes, nausea, vomiting, SOB, CP, or RUQ/epigastric pain.     OBJECTIVE:    Gen: NAD, AAOx3  CV: S1S2, RRR  Pulm: CTABL, no WRR  : [place]   Ext: SCDs bilaterally  Neuro: DTRs 2+  Urine Output: 275cc last hour    T(C): 36.9 (19 @ 11:05), Max: 36.9 (19 @ 11:05)  HR: 98 (19 @ 14:24) (78 - 122)  BP: 134/79 (19 @ 14:24) (99/54 - 162/78)  RR: 18 (19 @ 07:02) (17 - 18)  SpO2: 100% (19 @ 09:29) (91% - 100%)     @ 07:01  -   @ 07:00  --------------------------------------------------------  IN: 1225 mL / OUT: 2620 mL / NET: -1395 mL    Magnesium, Serum: 5.9 mg/dL (19 @ 10:32)  Magnesium, Serum: 5.2 mg/dL (19 @ 02:16)    A/P  44y  s/p pCS POD #0 on Magnesium Sulfate for seizure prophylaxis.   No signs of acute Magnesium toxicity.   - Mag level scheduled for 1400  - Monitor BPs  - Strict I/Os  - Plan for DC Mag @ 1000 tomorrow

## 2019-06-26 NOTE — CHART NOTE - NSCHARTNOTEFT_GEN_A_CORE
45 yo  @ 35w3d admitted for IOL 2/2 GDMA2 and pre-eclampsia with severe features on magnesium therapy.  Patient examined at bedside. Reports worsening headache, denies dizziness, visual disturbances, SOB, and RUQ pain.   Patient had severe range blood pressure reading, repeat in 15 mins was below threshold.     Vital Signs Last 24 Hrs  T(C): 36.8 (2019 04:58), Max: 37.1 (2019 16:47)  T(F): 98.24 (2019 04:58), Max: 98.7 (2019 16:47)  HR: 93 (2019 06:53) (76 - 95)  BP: 158/92 (2019 06:53) (128/68 - 190/97)  BP(mean): --  RR: 17 (2019 04:58) (16 - 18)  SpO2: --    FHT: baseline 140, min-mod variability, no accels or decels   [place]: merle irregularly and frequently     Gen: mildly uncomfortable, congested (has a cold)   Lungs: CTAB   Cardio: RRR   Ext: nontender to palpation, +1 DTR's in upper extremities     Magnesium, Serum (. @ 02:16)    Magnesium, Serum: 5.2 mg/dL    UOP: 100+cc/hr for the last 4 hours     A/P: Will cycle BP's q10m for an hour. If she has another severe range blood pressure, will push Labetalol 20mg IV push again.   Will give Tylenol for headache control.

## 2019-06-26 NOTE — OB PROVIDER DELIVERY SUMMARY - NSPROVIDERDELIVERYNOTE_OBGYN_ALL_OB_FT
pfannensteil incision performed  peritoneum entered bluntly and no adhesions encountered  low transverse incision performed with scalpel  vertex delivered followed by atruamatic delivery of the shoulders  placenta removed manually  uterus closed in 2 layers followed by closure of rectus muscle   fascia closed with running suture followed by subcuticular closure of the skin    male fetus  apgar 8/9  ebl 300  baby to NICU

## 2019-06-26 NOTE — CHART NOTE - NSCHARTNOTEFT_GEN_A_CORE
43 yo  @ 35w3d admitted for IOL 2/2 GDMA2 and pre-eclampsia with severe features on magnesium therapy.    S: Patient was seen and evaluated at the bedside. She reports that her headache has improved. She denies changes in vision, CP, SOB, RUQ pain, N/V.    O:  Vital Signs Last 24 Hrs  T(C): 36.7 (2019 01:56), Max: 37.1 (2019 16:47)  T(F): 98.06 (2019 01:56), Max: 98.7 (2019 16:47)  HR: 88 (2019 02:43) (76 - 92)  BP: 146/87 (2019 02:43) (128/68 - 190/97)  RR: 18 (2019 01:56) (16 - 18)    Gen: NAD. Resting comfortably in bed.  CV: RRR  PULM: CTAB  Abd: Gravid. Soft. Nontender. Nondistended.  Ext: SCDs in place. 3+ pitting edema. 1+ DTRs in bilateral upper extremities.    UOP: ~250cc/hr    Magnesium, Serum (19 @ 02:16)    Magnesium, Serum: 5.2 mg/dL    A/P:  43 yo  @ 35w3d admitted for IOL 2/2 GDMA2 and pre-eclampsia with severe features on magnesium therapy.  -Blood pressures stable. Headache resolved.  -Adequate urine output.  -Will continue to monitor for symptoms of magnesium toxicity 43 yo  @ 35w3d admitted for IOL 2/2 GDMA2 and pre-eclampsia with severe features on magnesium therapy.    S: Patient was seen and evaluated at the bedside. She reports that her headache has improved. She denies changes in vision, CP, SOB, RUQ pain, N/V.    O:  Vital Signs Last 24 Hrs  T(C): 36.7 (2019 01:56), Max: 37.1 (2019 16:47)  T(F): 98.06 (2019 01:56), Max: 98.7 (2019 16:47)  HR: 88 (2019 02:43) (76 - 92)  BP: 146/87 (2019 02:43) (128/68 - 190/97)  RR: 18 (2019 01:56) (16 - 18)    Gen: NAD. Resting comfortably in bed.  CV: RRR  PULM: CTAB  Abd: Gravid. Soft. Nontender. Nondistended.  Ext: SCDs in place. 3+ pitting edema. 1+ DTRs in bilateral upper extremities.    UOP: ~250cc/hr    Magnesium, Serum (19 @ 02:16)    Magnesium, Serum: 5.2 mg/dL    A/P:  43 yo  @ 35w3d admitted for IOL 2/2 GDMA2 and pre-eclampsia with severe features on magnesium therapy. Stable no signs symptoms of magnesium sulfate toxicity  -Blood pressures stable. Headache resolved.  -Adequate urine output.  -Will continue to monitor for symptoms of magnesium toxicity  Continue cytotec induction  ATTENDING NOTE  Patient was seen and evaluated.   minimal to moderate variability no decels positive accels irregular contractions. I agree with above assessment and plan  GLENIS Bernal MD

## 2019-06-26 NOTE — CHART NOTE - NSCHARTNOTEFT_GEN_A_CORE
43 y/o now  s/p pCS at 35w3d 2/2 NRFHT during IOL for pre-eclampsia with severe features now on post partum 24hr Mg infusion therapy.   Patient examined at bedside, resting comfortably.   Denies headache, dizziness, visual disturbances, SOB, and RUQ pain.     Vital Signs Last 24 Hrs  T(C): 36.7 (2019 19:30), Max: 36.9 (2019 11:05)  T(F): 98.1 (2019 19:30), Max: 98.4 (2019 11:05)  HR: 83 (2019 23:00) (78 - 122)  BP: 129/76 (2019 22:24) (99/54 - 162/78)  BP(mean): --  RR: 18 (2019 07:02) (17 - 18)  SpO2: 99% (2019 23:05) (91% - 100%)    Gen: NAD  Lungs: CTAB  Cardio: RRR  Ext: +1 brachioradialis and bicep DTR's bilaterally     UOP: 75+ cc/hr for the last 4 hours     Magnesium, Serum (19 @ 19:02)    Magnesium, Serum: 6.3 mg/dL    A/P: Will continue to monitor BP's and assessing for development of symptoms of Mg toxicity. Mg serum level adequate, will continue to draw q6h. Currently asymptomatic.

## 2019-06-26 NOTE — OB NEONATOLOGY/PEDIATRICIAN DELIVERY SUMMARY - NSPEDSNEONOTESA_OBGYN_ALL_OB_FT
Baby Humberto Terrell is a 35.3 wk late  AGA  born at 0944 on 19 to 43 yo   A+/unknown GBS / RPR NR/ HIV neg/ HepBSAg neg/RI mom with EDC 19.  Mom had prenatal care.  On Metformin for GDM.  L&D: Mom admitted for iol upon MFM recommened.  On Mag sulfate and Hydralazine for PEC.  AROM < 1 h PTD.  Received mitiple doses of Ampicillin for inknown GBS  STAT c/s for non-reassuring fetal heart tracings/ fetal bradycardia.  Upon delivery clear AF; vertex.  Slightly reduced tone.  Well-suctioned, dried and stimulated with good response.  AS .  A/P: 35.3 wk late  delivered by C/S.  transfer to [facility] for further management.  Servando Scherer MD

## 2019-06-26 NOTE — CHART NOTE - NSCHARTNOTEFT_GEN_A_CORE
Vital Signs Last 24 Hrs  T(C): 36.8 (26 Jun 2019 07:02), Max: 37.1 (25 Jun 2019 16:47)  T(F): 98.24 (26 Jun 2019 07:02), Max: 98.7 (25 Jun 2019 16:47)  HR: 96 (26 Jun 2019 07:52) (76 - 96)  BP: 151/82 (26 Jun 2019 07:52) (128/68 - 190/97)  RR: 18 (26 Jun 2019 07:02) (16 - 18)    06-25 @ 07:01  -  06-26 @ 07:00  --------------------------------------------------------  IN: 1225 mL / OUT: 2620 mL / NET: -1395 mL        FETAL HEART RATE   fetal heart rate with minimal variability and irregular contractions    CERVICAL EXAM:  2/50/-3   cervical manzanares balloon inserted  ? SROM at 315am with clear fluid    PAIN SCALE (0-10): 4    Patient status post 5mg hydralyzine at 730 am for bp 160/90 x 2  patient received tylenol as well for complaints of headache                          11.3   9.78  )-----------( 177      ( 26 Jun 2019 04:53 )             34.6   06-26    135  |  101  |  9.0  ----------------------------<  86  3.9   |  18.0<L>  |  0.77    Ca    8.5<L>      26 Jun 2019 04:53  Mg     5.2     06-26    TPro  6.5<L>  /  Alb  3.0<L>  /  TBili  0.5  /  DBili  x   /  AST  15  /  ALT  11  /  AlkPhos  229<H>  06-26    PLAN:  1. continue magnesium and serial labs  2. continue induction (cytotec held several times overnight due to contraction pattern)

## 2019-06-26 NOTE — CHART NOTE - NSCHARTNOTEFT_GEN_A_CORE
Vital Signs Last 24 Hrs  T(F): 98.24 (26 Jun 2019 07:02), Max: 98.7 (25 Jun 2019 16:47)  HR: 122 (26 Jun 2019 09:29) (76 - 122)  BP: 107/54 (26 Jun 2019 09:27) (99/54 - 190/97)  RR: 18 (26 Jun 2019 07:02) (16 - 18)  SpO2: 100% (26 Jun 2019 09:29) (91% - 100%)    FETAL HEART RATE   fetal candelario rate with repetitive late decels status post epidural       CERVICAL EXAM:  4/50/-3  cervical manzanares displaced spontaneously  arom with clear fluid andfse placed        PAIN SCALE (0-10): 3    PLAN:  1. patient for urgent c/section  2.

## 2019-06-27 ENCOUNTER — APPOINTMENT (OUTPATIENT)
Dept: MATERNAL FETAL MEDICINE | Facility: CLINIC | Age: 44
End: 2019-06-27

## 2019-06-27 ENCOUNTER — TRANSCRIPTION ENCOUNTER (OUTPATIENT)
Age: 44
End: 2019-06-27

## 2019-06-27 LAB
ALBUMIN SERPL ELPH-MCNC: 2.6 G/DL — LOW (ref 3.3–5.2)
ALBUMIN SERPL ELPH-MCNC: 2.7 G/DL — LOW (ref 3.3–5.2)
ALP SERPL-CCNC: 207 U/L — HIGH (ref 40–120)
ALP SERPL-CCNC: 215 U/L — HIGH (ref 40–120)
ALT FLD-CCNC: 10 U/L — SIGNIFICANT CHANGE UP
ALT FLD-CCNC: 11 U/L — SIGNIFICANT CHANGE UP
ANION GAP SERPL CALC-SCNC: 12 MMOL/L — SIGNIFICANT CHANGE UP (ref 5–17)
ANION GAP SERPL CALC-SCNC: 12 MMOL/L — SIGNIFICANT CHANGE UP (ref 5–17)
APTT BLD: 27.9 SEC — SIGNIFICANT CHANGE UP (ref 27.5–36.3)
APTT BLD: 28.1 SEC — SIGNIFICANT CHANGE UP (ref 27.5–36.3)
AST SERPL-CCNC: 25 U/L — SIGNIFICANT CHANGE UP
AST SERPL-CCNC: 25 U/L — SIGNIFICANT CHANGE UP
BASOPHILS # BLD AUTO: 0.02 K/UL — SIGNIFICANT CHANGE UP (ref 0–0.2)
BASOPHILS # BLD AUTO: 0.03 K/UL — SIGNIFICANT CHANGE UP (ref 0–0.2)
BASOPHILS NFR BLD AUTO: 0.1 % — SIGNIFICANT CHANGE UP (ref 0–2)
BASOPHILS NFR BLD AUTO: 0.2 % — SIGNIFICANT CHANGE UP (ref 0–2)
BILIRUB SERPL-MCNC: 0.6 MG/DL — SIGNIFICANT CHANGE UP (ref 0.4–2)
BILIRUB SERPL-MCNC: 0.7 MG/DL — SIGNIFICANT CHANGE UP (ref 0.4–2)
BUN SERPL-MCNC: 6 MG/DL — LOW (ref 8–20)
BUN SERPL-MCNC: 6 MG/DL — LOW (ref 8–20)
CALCIUM SERPL-MCNC: 7.3 MG/DL — LOW (ref 8.6–10.2)
CALCIUM SERPL-MCNC: 7.6 MG/DL — LOW (ref 8.6–10.2)
CHLORIDE SERPL-SCNC: 100 MMOL/L — SIGNIFICANT CHANGE UP (ref 98–107)
CHLORIDE SERPL-SCNC: 98 MMOL/L — SIGNIFICANT CHANGE UP (ref 98–107)
CO2 SERPL-SCNC: 21 MMOL/L — LOW (ref 22–29)
CO2 SERPL-SCNC: 22 MMOL/L — SIGNIFICANT CHANGE UP (ref 22–29)
CREAT SERPL-MCNC: 0.64 MG/DL — SIGNIFICANT CHANGE UP (ref 0.5–1.3)
CREAT SERPL-MCNC: 0.65 MG/DL — SIGNIFICANT CHANGE UP (ref 0.5–1.3)
EOSINOPHIL # BLD AUTO: 0.01 K/UL — SIGNIFICANT CHANGE UP (ref 0–0.5)
EOSINOPHIL # BLD AUTO: 0.07 K/UL — SIGNIFICANT CHANGE UP (ref 0–0.5)
EOSINOPHIL NFR BLD AUTO: 0.1 % — SIGNIFICANT CHANGE UP (ref 0–6)
EOSINOPHIL NFR BLD AUTO: 0.5 % — SIGNIFICANT CHANGE UP (ref 0–6)
FIBRINOGEN PPP-MCNC: 824 MG/DL — CRITICAL HIGH (ref 350–510)
FIBRINOGEN PPP-MCNC: 870 MG/DL — CRITICAL HIGH (ref 350–510)
GLUCOSE SERPL-MCNC: 119 MG/DL — HIGH (ref 70–115)
GLUCOSE SERPL-MCNC: 122 MG/DL — HIGH (ref 70–115)
HCT VFR BLD CALC: 31.2 % — LOW (ref 34.5–45)
HCT VFR BLD CALC: 34.4 % — LOW (ref 34.5–45)
HGB BLD-MCNC: 10.2 G/DL — LOW (ref 11.5–15.5)
HGB BLD-MCNC: 11.2 G/DL — LOW (ref 11.5–15.5)
IMM GRANULOCYTES NFR BLD AUTO: 0.6 % — SIGNIFICANT CHANGE UP (ref 0–1.5)
IMM GRANULOCYTES NFR BLD AUTO: 0.9 % — SIGNIFICANT CHANGE UP (ref 0–1.5)
INR BLD: 0.92 RATIO — SIGNIFICANT CHANGE UP (ref 0.88–1.16)
INR BLD: 0.93 RATIO — SIGNIFICANT CHANGE UP (ref 0.88–1.16)
LDH SERPL L TO P-CCNC: 376 U/L — HIGH (ref 98–192)
LDH SERPL L TO P-CCNC: 381 U/L — HIGH (ref 98–192)
LYMPHOCYTES # BLD AUTO: 1.46 K/UL — SIGNIFICANT CHANGE UP (ref 1–3.3)
LYMPHOCYTES # BLD AUTO: 1.84 K/UL — SIGNIFICANT CHANGE UP (ref 1–3.3)
LYMPHOCYTES # BLD AUTO: 10.3 % — LOW (ref 13–44)
LYMPHOCYTES # BLD AUTO: 11.6 % — LOW (ref 13–44)
MAGNESIUM SERPL-MCNC: 6.3 MG/DL — HIGH (ref 1.6–2.6)
MAGNESIUM SERPL-MCNC: 6.9 MG/DL — HIGH (ref 1.6–2.6)
MCHC RBC-ENTMCNC: 27.7 PG — SIGNIFICANT CHANGE UP (ref 27–34)
MCHC RBC-ENTMCNC: 27.8 PG — SIGNIFICANT CHANGE UP (ref 27–34)
MCHC RBC-ENTMCNC: 32.6 GM/DL — SIGNIFICANT CHANGE UP (ref 32–36)
MCHC RBC-ENTMCNC: 32.7 GM/DL — SIGNIFICANT CHANGE UP (ref 32–36)
MCV RBC AUTO: 85 FL — SIGNIFICANT CHANGE UP (ref 80–100)
MCV RBC AUTO: 85.1 FL — SIGNIFICANT CHANGE UP (ref 80–100)
MEV IGG SER-ACNC: 156 AU/ML — SIGNIFICANT CHANGE UP
MEV IGG+IGM SER-IMP: POSITIVE — SIGNIFICANT CHANGE UP
MONOCYTES # BLD AUTO: 0.86 K/UL — SIGNIFICANT CHANGE UP (ref 0–0.9)
MONOCYTES # BLD AUTO: 0.89 K/UL — SIGNIFICANT CHANGE UP (ref 0–0.9)
MONOCYTES NFR BLD AUTO: 5.4 % — SIGNIFICANT CHANGE UP (ref 2–14)
MONOCYTES NFR BLD AUTO: 6.3 % — SIGNIFICANT CHANGE UP (ref 2–14)
NEUTROPHILS # BLD AUTO: 11.65 K/UL — HIGH (ref 1.8–7.4)
NEUTROPHILS # BLD AUTO: 13.01 K/UL — HIGH (ref 1.8–7.4)
NEUTROPHILS NFR BLD AUTO: 81.8 % — HIGH (ref 43–77)
NEUTROPHILS NFR BLD AUTO: 82.2 % — HIGH (ref 43–77)
PLATELET # BLD AUTO: 156 K/UL — SIGNIFICANT CHANGE UP (ref 150–400)
PLATELET # BLD AUTO: 164 K/UL — SIGNIFICANT CHANGE UP (ref 150–400)
POTASSIUM SERPL-MCNC: 3.7 MMOL/L — SIGNIFICANT CHANGE UP (ref 3.5–5.3)
POTASSIUM SERPL-MCNC: 4.2 MMOL/L — SIGNIFICANT CHANGE UP (ref 3.5–5.3)
POTASSIUM SERPL-SCNC: 3.7 MMOL/L — SIGNIFICANT CHANGE UP (ref 3.5–5.3)
POTASSIUM SERPL-SCNC: 4.2 MMOL/L — SIGNIFICANT CHANGE UP (ref 3.5–5.3)
PROT SERPL-MCNC: 6.2 G/DL — LOW (ref 6.6–8.7)
PROT SERPL-MCNC: 6.3 G/DL — LOW (ref 6.6–8.7)
PROTHROM AB SERPL-ACNC: 10.6 SEC — SIGNIFICANT CHANGE UP (ref 10–12.9)
PROTHROM AB SERPL-ACNC: 10.7 SEC — SIGNIFICANT CHANGE UP (ref 10–12.9)
RBC # BLD: 3.67 M/UL — LOW (ref 3.8–5.2)
RBC # BLD: 4.04 M/UL — SIGNIFICANT CHANGE UP (ref 3.8–5.2)
RBC # FLD: 14.1 % — SIGNIFICANT CHANGE UP (ref 10.3–14.5)
RBC # FLD: 14.1 % — SIGNIFICANT CHANGE UP (ref 10.3–14.5)
SODIUM SERPL-SCNC: 132 MMOL/L — LOW (ref 135–145)
SODIUM SERPL-SCNC: 133 MMOL/L — LOW (ref 135–145)
T PALLIDUM AB TITR SER: NEGATIVE — SIGNIFICANT CHANGE UP
T PALLIDUM AB TITR SER: NEGATIVE — SIGNIFICANT CHANGE UP
URATE SERPL-MCNC: 5.8 MG/DL — HIGH (ref 2.4–5.7)
URATE SERPL-MCNC: 5.9 MG/DL — HIGH (ref 2.4–5.7)
WBC # BLD: 14.17 K/UL — HIGH (ref 3.8–10.5)
WBC # BLD: 15.9 K/UL — HIGH (ref 3.8–10.5)
WBC # FLD AUTO: 14.17 K/UL — HIGH (ref 3.8–10.5)
WBC # FLD AUTO: 15.9 K/UL — HIGH (ref 3.8–10.5)

## 2019-06-27 RX ORDER — SODIUM CHLORIDE 9 MG/ML
1000 INJECTION, SOLUTION INTRAVENOUS
Refills: 0 | Status: DISCONTINUED | OUTPATIENT
Start: 2019-06-27 | End: 2019-06-30

## 2019-06-27 RX ORDER — IBUPROFEN 200 MG
600 TABLET ORAL EVERY 6 HOURS
Refills: 0 | Status: DISCONTINUED | OUTPATIENT
Start: 2019-06-27 | End: 2019-06-30

## 2019-06-27 RX ORDER — LABETALOL HCL 100 MG
200 TABLET ORAL EVERY 12 HOURS
Refills: 0 | Status: DISCONTINUED | OUTPATIENT
Start: 2019-06-27 | End: 2019-06-30

## 2019-06-27 RX ORDER — MAGNESIUM SULFATE 500 MG/ML
1 VIAL (ML) INJECTION
Qty: 40 | Refills: 0 | Status: DISCONTINUED | OUTPATIENT
Start: 2019-06-27 | End: 2019-06-27

## 2019-06-27 RX ORDER — ACETAMINOPHEN 500 MG
650 TABLET ORAL EVERY 6 HOURS
Refills: 0 | Status: DISCONTINUED | OUTPATIENT
Start: 2019-06-27 | End: 2019-06-30

## 2019-06-27 RX ADMIN — Medication 975 MILLIGRAM(S): at 18:48

## 2019-06-27 RX ADMIN — Medication 200 MILLIGRAM(S): at 18:05

## 2019-06-27 RX ADMIN — Medication 30 MILLIGRAM(S): at 01:07

## 2019-06-27 RX ADMIN — Medication 25 GM/HR: at 05:23

## 2019-06-27 RX ADMIN — Medication 30 MILLIGRAM(S): at 01:25

## 2019-06-27 RX ADMIN — Medication 30 MILLIGRAM(S): at 10:30

## 2019-06-27 RX ADMIN — Medication 975 MILLIGRAM(S): at 18:09

## 2019-06-27 RX ADMIN — SIMETHICONE 80 MILLIGRAM(S): 80 TABLET, CHEWABLE ORAL at 18:08

## 2019-06-27 RX ADMIN — OXYCODONE HYDROCHLORIDE 10 MILLIGRAM(S): 5 TABLET ORAL at 16:22

## 2019-06-27 RX ADMIN — SODIUM CHLORIDE 100 MILLILITER(S): 9 INJECTION, SOLUTION INTRAVENOUS at 05:23

## 2019-06-27 RX ADMIN — Medication 30 MILLIGRAM(S): at 10:45

## 2019-06-27 RX ADMIN — OXYCODONE HYDROCHLORIDE 10 MILLIGRAM(S): 5 TABLET ORAL at 17:22

## 2019-06-27 RX ADMIN — Medication 200 MILLIGRAM(S): at 07:51

## 2019-06-27 NOTE — DISCHARGE NOTE OB - MATERIALS PROVIDED
Vaccinations/[facility]  Screening Program/  Immunization Record/Bottle Feeding Log/Breastfeeding Mother’s Support Group Information/Guide to Postpartum Care/[facility] Hearing Screen Program/Birth Certificate Instructions/Discharge Medication Information for Patients and Families Pocket Guide/Letter of Medical Neccessity/MMR Vaccination (VIS Pub Date: 2012)/Tdap Vaccination (VIS Pub Date: 2012)

## 2019-06-27 NOTE — DISCHARGE NOTE OB - CARE PROVIDER_API CALL
[facility],   [address] [place] [address]  Phone: [phone]  Fax: [phone]  Phone: (   )    -  Fax: (   )    -  Follow Up Time:

## 2019-06-27 NOTE — DISCHARGE NOTE OB - CARE PLAN
Principal Discharge DX:	 delivery delivered  Goal:	rapid recovery  Assessment and plan of treatment:	Please call your provider in 1-2 weeks for wound check. Take medications as directed, regular diet, activity as tolerated. Exclusive breast feeding for the first 6 months is recommended. Nothing per vagina for 6 weeks (incl. sex, douching, etc). If you have additional concerns, please inform your provider.  Secondary Diagnosis:	Preeclampsia in postpartum period  Assessment and plan of treatment:	Please call your provider in 1 week to monitor your blood pressure. Take medications as directed, regular diet, activity as tolerated. Exclusive breast feeding for the first 6 months is recommended. Nothing per vagina for 6 weeks (incl. sex, douching, etc). If you have any headache, vision changes, or any additonal concerns, please call your provider. Principal Discharge DX:	 delivery delivered  Goal:	rapid recovery  Assessment and plan of treatment:	Please call your provider in 1-2 weeks for wound check. Take medications as directed, regular diet, activity as tolerated. Exclusive breast feeding for the first 6 months is recommended. Nothing per vagina for 6 weeks (incl. sex, douching, etc). If you have additional concerns, please inform your provider.  Secondary Diagnosis:	Preeclampsia in postpartum period  Assessment and plan of treatment:	Please take your blood pressure in the morning and evening and record it. Please call your provider in 1 week to monitor your blood pressure. Take medications as directed, regular diet, activity as tolerated. Exclusive breast feeding for the first 6 months is recommended. Nothing per vagina for 6 weeks (incl. sex, douching, etc). If you have any headache, vision changes, or any additonal concerns, please call your provider.

## 2019-06-27 NOTE — DISCHARGE NOTE OB - HOSPITAL COURSE
Pt is 45yo  s/p primary  section for pre-eclampsia with severe features and non-reassuring fetal heart tracing. She received 24hr of magnesium sulfate post-partum. She was transferred to postpartum unit without complications during her stay. Upon discharge she is voiding, tolerating PO, ambulating, and pain is well controlled.

## 2019-06-27 NOTE — DISCHARGE NOTE OB - MEDICATION SUMMARY - MEDICATIONS TO TAKE
I will START or STAY ON the medications listed below when I get home from the hospital:    ibuprofen 600 mg oral tablet  -- 1 tab(s) by mouth every 6 hours   -- Do not take this drug if you are pregnant.  It is very important that you take or use this exactly as directed.  Do not skip doses or discontinue unless directed by your doctor.  May cause drowsiness or dizziness.  Obtain medical advice before taking any non-prescription drugs as some may affect the action of this medication.  Take with food or milk.    -- Indication: For mild pain    oxyCODONE-acetaminophen 5 mg-325 mg oral tablet  -- 1 tab(s) by mouth every 6 hours, As Needed -Moderate Pain (4 - 6) MDD:4 tabs  -- Indication: For mod pain    ferrous sulfate 325 mg (65 mg elemental iron) oral tablet  -- 1 tab(s) by mouth once a day   -- Check with your doctor before becoming pregnant.  Do not chew, break, or crush.  May discolor urine or feces.    -- Indication: For vitamin    docusate sodium 100 mg oral capsule  -- 1 cap(s) by mouth 2 times a day  -for constipation   -- Medication should be taken with plenty of water.    -- Indication: For constipation

## 2019-06-27 NOTE — DISCHARGE NOTE OB - PATIENT PORTAL LINK FT
You can access the [facility] Patient Portal, offered by [facility], by registering with the following website: [email or web address]

## 2019-06-27 NOTE — CHART NOTE - NSCHARTNOTEFT_GEN_A_CORE
43 y/o  now POD#1 s/p pCS at 35w3d 2/2 NRFHT during IOL for pre-eclampsia with severe features now on post partum 24hr Mg infusion therapy.   Patient examined at bedside, resting comfortably.   Denies headache, dizziness, visual disturbances, SOB, and RUQ pain.     Vital Signs Last 24 Hrs  T(C): 36.5 (2019 03:03), Max: 36.9 (2019 11:05)  T(F): 97.7 (2019 03:03), Max: 98.4 (2019 11:05)  HR: 82 (2019 04:50) (76 - 122)  BP: 128/75 (2019 04:24) (99/54 - 162/78)  BP(mean): --  RR: 18 (2019 07:02) (17 - 18)  SpO2: 100% (2019 04:50) (91% - 100%)    Gen: NAD  Lungs: CTAB  Cardio: RRR  Ext: +1 brachioradialis DTR's bilaterally     UOP: 100+cc/hr for the last 4 hours    Magnesium, Serum (27.19 @ 02:09)    Magnesium, Serum: 6.9 mg/dL      A/P: Will continue to monitor BP's and assessing for development of symptoms of Mg toxicity. Mg serum level adequate, will continue to draw q6h. Currently asymptomatic.

## 2019-06-27 NOTE — DISCHARGE NOTE OB - PROVIDER TOKENS
FREE:[LAST:[HR],PHONE:[(   )    -],FAX:[(   )    -],ADDRESS:[[address] [place] [address]  Phone: [phone]  Fax: [phone]]]

## 2019-06-27 NOTE — DISCHARGE NOTE OB - PLAN OF CARE
Please call your provider in 1 week to monitor your blood pressure. Take medications as directed, regular diet, activity as tolerated. Exclusive breast feeding for the first 6 months is recommended. Nothing per vagina for 6 weeks (incl. sex, douching, etc). If you have any headache, vision changes, or any additonal concerns, please call your provider. rapid recovery Please call your provider in 1-2 weeks for wound check. Take medications as directed, regular diet, activity as tolerated. Exclusive breast feeding for the first 6 months is recommended. Nothing per vagina for 6 weeks (incl. sex, douching, etc). If you have additional concerns, please inform your provider. Please take your blood pressure in the morning and evening and record it. Please call your provider in 1 week to monitor your blood pressure. Take medications as directed, regular diet, activity as tolerated. Exclusive breast feeding for the first 6 months is recommended. Nothing per vagina for 6 weeks (incl. sex, douching, etc). If you have any headache, vision changes, or any additonal concerns, please call your provider.

## 2019-06-28 ENCOUNTER — APPOINTMENT (OUTPATIENT)
Dept: ANTEPARTUM | Facility: CLINIC | Age: 44
End: 2019-06-28

## 2019-06-28 RX ADMIN — Medication 200 MILLIGRAM(S): at 18:40

## 2019-06-28 RX ADMIN — Medication 600 MILLIGRAM(S): at 14:46

## 2019-06-28 RX ADMIN — Medication 600 MILLIGRAM(S): at 15:30

## 2019-06-28 RX ADMIN — Medication 650 MILLIGRAM(S): at 17:40

## 2019-06-28 RX ADMIN — Medication 650 MILLIGRAM(S): at 17:02

## 2019-06-28 RX ADMIN — Medication 30 MILLIGRAM(S): at 02:29

## 2019-06-28 RX ADMIN — Medication 200 MILLIGRAM(S): at 05:37

## 2019-06-28 RX ADMIN — Medication 30 MILLIGRAM(S): at 02:44

## 2019-06-29 RX ORDER — FERROUS SULFATE 325(65) MG
1 TABLET ORAL
Qty: 30 | Refills: 0
Start: 2019-06-29 | End: 2019-07-28

## 2019-06-29 RX ORDER — IBUPROFEN 200 MG
1 TABLET ORAL
Qty: 28 | Refills: 0
Start: 2019-06-29 | End: 2019-07-05

## 2019-06-29 RX ORDER — OXYCODONE AND ACETAMINOPHEN 5; 325 MG/1; MG/1
1 TABLET ORAL
Qty: 20 | Refills: 0
Start: 2019-06-29 | End: 2019-07-03

## 2019-06-29 RX ORDER — DOCUSATE SODIUM 100 MG
1 CAPSULE ORAL
Qty: 60 | Refills: 0
Start: 2019-06-29 | End: 2019-07-28

## 2019-06-29 RX ORDER — LABETALOL HCL 100 MG
1 TABLET ORAL
Qty: 28 | Refills: 0
Start: 2019-06-29 | End: 2019-07-12

## 2019-06-29 RX ADMIN — Medication 200 MILLIGRAM(S): at 05:56

## 2019-06-29 RX ADMIN — Medication 600 MILLIGRAM(S): at 18:14

## 2019-06-29 RX ADMIN — Medication 200 MILLIGRAM(S): at 18:14

## 2019-06-29 RX ADMIN — Medication 600 MILLIGRAM(S): at 18:50

## 2019-06-29 RX ADMIN — Medication 600 MILLIGRAM(S): at 06:48

## 2019-06-29 RX ADMIN — Medication 650 MILLIGRAM(S): at 11:25

## 2019-06-29 RX ADMIN — Medication 600 MILLIGRAM(S): at 06:01

## 2019-06-29 RX ADMIN — Medication 650 MILLIGRAM(S): at 10:44

## 2019-06-29 NOTE — PROGRESS NOTE ADULT - ATTENDING COMMENTS
POD#1  doing well   denies any HA, visual changes or epigastric pain  Incision C/D/I, JOSÉ in place  BP mostly mild range, had few in the severe, did not meet protocol- will start on labetalol 200mg BID and monitor  mag to be d/c at 1000  continue current mgmt
POD#3  doing well  meeting milestones  Incision C/D, JOSÉ in place  continue current care  d/c home tomorrow  ppalos

## 2019-06-30 VITALS
RESPIRATION RATE: 18 BRPM | SYSTOLIC BLOOD PRESSURE: 137 MMHG | HEART RATE: 77 BPM | DIASTOLIC BLOOD PRESSURE: 89 MMHG | TEMPERATURE: 98 F

## 2019-06-30 RX ORDER — IBUPROFEN 200 MG
1 TABLET ORAL
Qty: 28 | Refills: 0
Start: 2019-06-30 | End: 2019-07-06

## 2019-06-30 RX ORDER — FERROUS SULFATE 325(65) MG
1 TABLET ORAL
Qty: 30 | Refills: 0
Start: 2019-06-30 | End: 2019-07-29

## 2019-06-30 RX ORDER — DOCUSATE SODIUM 100 MG
1 CAPSULE ORAL
Qty: 60 | Refills: 0
Start: 2019-06-30 | End: 2019-07-29

## 2019-06-30 RX ORDER — LABETALOL HCL 100 MG
1 TABLET ORAL
Qty: 28 | Refills: 0
Start: 2019-06-30 | End: 2019-07-13

## 2019-06-30 RX ORDER — OXYCODONE AND ACETAMINOPHEN 5; 325 MG/1; MG/1
1 TABLET ORAL
Qty: 20 | Refills: 0
Start: 2019-06-30 | End: 2019-07-04

## 2019-06-30 RX ADMIN — Medication 600 MILLIGRAM(S): at 12:24

## 2019-06-30 RX ADMIN — Medication 200 MILLIGRAM(S): at 05:25

## 2019-06-30 RX ADMIN — Medication 600 MILLIGRAM(S): at 13:15

## 2019-06-30 RX ADMIN — Medication 600 MILLIGRAM(S): at 05:25

## 2019-06-30 NOTE — PROGRESS NOTE ADULT - ASSESSMENT
43 y/o  now POD#2 s/p pCS at 35w3d,         - continue post partum care  - encourage ambulation   - pain management with medication PRN  - likely d/c home day# 4
45 y/o  now POD#2 s/p pCS at 35w3d,         - continue post partum care  - encourage ambulation   - pain management with medication PRN  -likely d/c home day# 3
A/P   Section Day: 4  Patient is feeling well overall.     Continue the current pain medication.   Encourage ambulation and regular diet.   Continue DVT ppx  Plan to discharge today ccording to the normal criteria.
43 y/o  now POD#1 s/p pCS at 35w3d, currently on post partum Mg infusion therapy.   - set to come off Mg at 10:00  - continue post partum care  - encourage ambulation   - pain management with medication PRN

## 2019-06-30 NOTE — PROGRESS NOTE ADULT - SUBJECTIVE AND OBJECTIVE BOX
CAPILLARY BLOOD GLUCOSE        43 y/o  now POD#3 s/p pCS at 35w3d 2/2 NRFHT during IOL for pre-eclampsia with severe features now on post partum 24hr Mg infusion therapy.      No acute overnight events. Pain well controlled.  Patient is not ambulating yet, Castro catheter in place, +Flatus, -BM  Reports minimal lochia.   +breast feeding, -breast tenderness    VS:     Vital Signs  T(F): 98.5 (2019 05:13), Max: 99 (2019 08:17)  HR: 86 (2019 05:55) (80 - 100)  BP: 132/88 (2019 05:55) (103/69 - 134/81)  BP(mean): 80 (2019 15:06) (80 - 80)  RR: 18 (2019 05:13) (18 - 18)  SpO2: 98% (2019 05:13) (97% - 98%)        Physical Exam:  General: NAD  Abdomen: soft, ND, firm fundus palpated above the umbilicus. JOSÉ dressing in place, no blood or drainage, intact.           MEDICATIONS  (STANDING):  diphtheria/tetanus/pertussis (acellular) Vaccine (ADAcel) 0.5 milliLiter(s) IntraMuscular once  labetalol 200 milliGRAM(s) Oral every 12 hours  lactated ringers. 1000 milliLiter(s) (100 mL/Hr) IV Continuous <Continuous>  oxytocin Infusion 333.333 milliUNIT(s)/Min (1000 mL/Hr) IV Continuous <Continuous>    MEDICATIONS  (PRN):  acetaminophen   Tablet .. 650 milliGRAM(s) Oral every 6 hours PRN Temp greater or equal to 38C (100.4F), Moderate Pain (4 - 6), Severe Pain (7 - 10)  diphenhydrAMINE 25 milliGRAM(s) Oral every 6 hours PRN Itching  diphenhydrAMINE   Injectable 50 milliGRAM(s) IV Push every 4 hours PRN Pruritus  docusate sodium 100 milliGRAM(s) Oral two times a day PRN Stool softening  glycerin Suppository - Adult 1 Suppository(s) Rectal at bedtime PRN Constipation  ibuprofen  Tablet. 600 milliGRAM(s) Oral every 6 hours PRN Temp greater or equal to 38C (100.4F), Moderate Pain (4 - 6), Severe Pain (7 - 10)  lanolin Ointment 1 Application(s) Topical every 6 hours PRN Sore Nipples  magnesium hydroxide Suspension 30 milliLiter(s) Oral two times a day PRN Constipation  naloxone Injectable 0.1 milliGRAM(s) IV Push every 3 minutes PRN For ANY of the following changes in patient status:  A. RR LESS THAN 10 breaths per minute, B. Oxygen saturation LESS THAN 90%, C. Sedation score of 6  ondansetron Injectable 4 milliGRAM(s) IV Push every 6 hours PRN Nausea  oxyCODONE    IR 5 milliGRAM(s) Oral every 3 hours PRN Mild Pain (1 - 3)  oxyCODONE    IR 10 milliGRAM(s) Oral every 3 hours PRN Moderate Pain (4 - 6)  oxyCODONE    IR 5 milliGRAM(s) Oral every 3 hours PRN Moderate to Severe Pain (4-10)  oxyCODONE    IR 5 milliGRAM(s) Oral once PRN Moderate to Severe Pain (4-10)  simethicone 80 milliGRAM(s) Chew every 4 hours PRN Gas
S: Patient doing well. Minimal lochia. No complaints.     O: Vital Signs Last 24 Hrs  T(C): 36.4 (2019 20:12), Max: 37.2 (2019 12:21)  T(F): 97.5 (2019 20:12), Max: 98.9 (2019 12:21)  HR: 91 (2019 20:12) (91 - 94)  BP: 109/73 (2019 20:12) (109/73 - 135/85)  BP(mean): --  RR: 18 (2019 20:12) (18 - 18)  SpO2: 97% (2019 20:12) (97% - 97%)    Gen: NAD  Breast : Breast feeding  Abd: soft, NT, ND, fundus firm below umbilicus  Ext: no tenderness    Labs:           PP # 2 s/p     Doing well.  Discharge home  in satisfactory codition.  Nothing in vagina and no heavy lifting for 6 weeks.   Follow up 6 weeks for post partum visit.  Call office for any fevers, chills, heavy vaginal bleeding, symptoms of depression, or any other concerning symptoms.  Continue motrin 600 mg every 6 hours.
Postpartum Note,  Section  Patient is a 44y  s/p primary  post-operative day 4.    Subjective:  No acute events overnight. The patient is feeling well. Pt stayed extra day for the  that is in the NICU  She is tolerating a diet and denies N/V.    Patient is having normal postpartum bleeding which is decreasing in amount.    She is pumping milk,    Urinating appropriately.   +BM/+flatus.    Physical exam:    Vital Signs Last 24 Hrs  T(C): 36.4 (2019 20:12), Max: 37.2 (2019 12:21)  T(F): 97.5 (2019 20:12), Max: 98.9 (2019 12:21)  HR: 91 (2019 20:12) (91 - 94)  BP: 109/73 (2019 20:12) (109/73 - 135/85)  RR: 18 (2019 20:12) (18 - 18)  SpO2: 97% (2019 20:12) (97% - 97%)    Heart: RRR  Lungs: CTABL  Breast: non tender, not engorged   Abdomen: Soft, nontender, no distension, firm uterine fundus, the JOSÉ has been removed, [place] dressing was applied, the incision is clean dry and intact  Ext: No DVT signs, warm extremities    LABS:
Subjective:  The patient feels well.  She is ambulating without difficulty.  She is tolerating PO.  She is voiding.  She denies nausea and vomiting.  Her pain is controlled.  She reports normal postpartum bleeding      Physical exam:    Vital Signs Last 24 Hrs  T(C): 36.8 (28 Jun 2019 04:03), Max: 36.9 (27 Jun 2019 07:25)  T(F): 98.2 (28 Jun 2019 04:03), Max: 98.5 (27 Jun 2019 16:03)  HR: 86 (28 Jun 2019 05:36) (76 - 100)  BP: 112/70 (28 Jun 2019 05:36) (87/49 - 182/79)  BP(mean): --  RR: 18 (28 Jun 2019 04:03) (16 - 18)  SpO2: 95% (28 Jun 2019 04:03) (93% - 100%)    Gen: NAD BP Elevated 137/83  Abdomen: Soft, nontender, no distension , firm uterine fundus at umbilicus.  Incision: Clean, dry, and intact with steri strips  Pelvic: Normal lochia noted  Ext: No calf tenderness    LABS:                        10.2   14.17 )-----------( 156      ( 27 Jun 2019 09:00 )             31.2     blood type    A/P POD # 3 s/p    Doing well.  Encourage ambulation.  Will reevaluate BP      No driving x 2 weeks.  Questions answered.
43 y/o  now POD#1 s/p pCS at 35w3d 2/2 NRFHT during IOL for pre-eclampsia with severe features now on post partum 24hr Mg infusion therapy.      No acute overnight events. Pain well controlled.  Patient is not ambulating yet, Castro catheter in place, +Flatus, -BM  Reports minimal lochia.   +breast feeding, -breast tenderness    VS:   Vital Signs Last 24 Hrs  T(C): 36.5 (2019 03:03), Max: 36.9 (2019 11:05)  T(F): 97.7 (2019 03:03), Max: 98.4 (2019 11:05)  HR: 81 (2019 05:15) (76 - 122)  BP: 128/75 (2019 04:24) (99/54 - 162/78)  RR: 18 (2019 07:02) (18 - 18)  SpO2: 97% (2019 05:15) (91% - 100%)    Physical Exam:  General: NAD  Abdomen: soft, ND, firm fundus palpated above the umbilicus. JOSÉ dressing in place, no blood or drainage, intact.   Ext: nontender lower extremities, bilaterally.     Labs:                        11.2   15.90 )-----------( 164      ( 2019 02:09 )             34.4
45 y/o  now POD#2 s/p pCS at 35w3d 2/2 NRFHT during IOL for pre-eclampsia with severe features now on post partum 24hr Mg infusion therapy.      No acute overnight events. Pain well controlled.  Patient is not ambulating yet, Castro catheter in place, +Flatus, -BM  Reports minimal lochia.   +breast feeding, -breast tenderness    VS:   Vital Signs Last 24 Hrs  T(C): 36.8 (2019 04:03), Max: 36.9 (2019 12:12)  T(F): 98.2 (2019 04:03), Max: 98.5 (2019 16:03)  HR: 86 (2019 05:36) (76 - 99)  BP: 112/70 (2019 05:36) (87/49 - 142/78)  RR: 18 (2019 04:03) (16 - 18)  SpO2: 95% (2019 04:03) (93% - 99%)        Physical Exam:  General: NAD  Abdomen: soft, ND, firm fundus palpated above the umbilicus. JOSÉ dressing in place, no blood or drainage, intact.           Labs:                        11.2   15.90 )-----------( 164      ( 2019 02:09 )             34.4

## 2019-07-05 ENCOUNTER — APPOINTMENT (OUTPATIENT)
Dept: ANTEPARTUM | Facility: CLINIC | Age: 44
End: 2019-07-05

## 2019-07-10 ENCOUNTER — APPOINTMENT (OUTPATIENT)
Dept: ANTEPARTUM | Facility: CLINIC | Age: 44
End: 2019-07-10

## 2019-07-10 PROCEDURE — 84156 ASSAY OF PROTEIN URINE: CPT

## 2019-07-10 PROCEDURE — 59050 FETAL MONITOR W/REPORT: CPT

## 2019-07-10 PROCEDURE — 85610 PROTHROMBIN TIME: CPT

## 2019-07-10 PROCEDURE — 86780 TREPONEMA PALLIDUM: CPT

## 2019-07-10 PROCEDURE — 86900 BLOOD TYPING SEROLOGIC ABO: CPT

## 2019-07-10 PROCEDURE — 82570 ASSAY OF URINE CREATININE: CPT

## 2019-07-10 PROCEDURE — 36415 COLL VENOUS BLD VENIPUNCTURE: CPT

## 2019-07-10 PROCEDURE — 86850 RBC ANTIBODY SCREEN: CPT

## 2019-07-10 PROCEDURE — 83735 ASSAY OF MAGNESIUM: CPT

## 2019-07-10 PROCEDURE — 86765 RUBEOLA ANTIBODY: CPT

## 2019-07-10 PROCEDURE — 86901 BLOOD TYPING SEROLOGIC RH(D): CPT

## 2019-07-10 PROCEDURE — 85384 FIBRINOGEN ACTIVITY: CPT

## 2019-07-10 PROCEDURE — 85027 COMPLETE CBC AUTOMATED: CPT

## 2019-07-10 PROCEDURE — 85730 THROMBOPLASTIN TIME PARTIAL: CPT

## 2019-07-10 PROCEDURE — 84550 ASSAY OF BLOOD/URIC ACID: CPT

## 2019-07-10 PROCEDURE — 87389 HIV-1 AG W/HIV-1&-2 AB AG IA: CPT

## 2019-07-10 PROCEDURE — 80053 COMPREHEN METABOLIC PANEL: CPT

## 2019-07-10 PROCEDURE — 83615 LACTATE (LD) (LDH) ENZYME: CPT

## 2019-07-10 PROCEDURE — 81001 URINALYSIS AUTO W/SCOPE: CPT

## 2019-07-10 PROCEDURE — 59025 FETAL NON-STRESS TEST: CPT

## 2019-07-10 PROCEDURE — 82962 GLUCOSE BLOOD TEST: CPT

## 2019-07-10 PROCEDURE — 86703 HIV-1/HIV-2 1 RESULT ANTBDY: CPT

## 2019-07-12 ENCOUNTER — APPOINTMENT (OUTPATIENT)
Dept: MATERNAL FETAL MEDICINE | Facility: CLINIC | Age: 44
End: 2019-07-12

## 2019-07-12 ENCOUNTER — APPOINTMENT (OUTPATIENT)
Dept: ANTEPARTUM | Facility: CLINIC | Age: 44
End: 2019-07-12

## 2019-07-19 ENCOUNTER — APPOINTMENT (OUTPATIENT)
Dept: ANTEPARTUM | Facility: CLINIC | Age: 44
End: 2019-07-19

## 2019-07-26 ENCOUNTER — APPOINTMENT (OUTPATIENT)
Dept: ANTEPARTUM | Facility: CLINIC | Age: 44
End: 2019-07-26

## 2020-01-09 ENCOUNTER — RESULT REVIEW (OUTPATIENT)
Age: 45
End: 2020-01-09

## 2020-08-10 ENCOUNTER — APPOINTMENT (OUTPATIENT)
Dept: ANTEPARTUM | Facility: CLINIC | Age: 45
End: 2020-08-10

## 2020-08-10 PROBLEM — Z00.00 ENCOUNTER FOR PREVENTIVE HEALTH EXAMINATION: Status: ACTIVE | Noted: 2020-08-10

## 2020-08-15 NOTE — OB PROVIDER H&P - NS_PRENATALRECORD_OBGYN_ALL_OB
Cardiology Daily Progress Note      Patient: Jassi Marr Date: 08/15/20   : 1982 Attending: Selvin Mcdonlad DO   38 year old        Chief Complaint: Atrial Fibrillation and chest pain    Interval Note:   Patient seen/examined  - vitals acceptable  - remains afebrile, trop is mildly elevated  - no new chest pain or shortness of breath      Vitals:    20 1200 20 1426 20 1600 20   BP: 131/67 139/80 (!) 140/67 123/73   Pulse: 62 (!) 57 62    Resp: 17 18 18    Temp: 98 °F (36.7 °C) 97.5 °F (36.4 °C) 97.6 °F (36.4 °C) 97.7 °F (36.5 °C)   TempSrc: Oral Oral Oral Oral   SpO2: 99% 99% 96% 97%   Weight:       Height:             Weight Trend:  Weight    20 0206 20 0541   Weight: (!) 149.7 kg (!) 147.5 kg       Intake/Output:  Date 20 - 08/15/20 0659 08/15/20 0700 - 20 0659   Shift 1289-5807 9059-2589 6940-1524 24 Hour Total 5515-7844 4996-9255 4190-4397 24 Hour Total   INTAKE   P.O. 480 240  720       I.V.   1919.5 1919.5       Shift Total .5 2639.5       OUTPUT   Shift Total           Weight (kg) 147.5 147.5 147.5 147.5 147.5 147.5 147.5 147.5       Medications/Infusions:  • atenolol  100 mg Oral Daily   • atorvastatin  10 mg Oral Nightly   • divalproex  1,000 mg Oral Nightly   • flecainide  100 mg Oral BID   • glipiZIDE  2.5 mg Oral QAM AC   • losartan  50 mg Oral Daily   • sertraline  200 mg Oral Daily   • zonisamide  300 mg Oral BID   • sodium chloride (PF)  2 mL Intracatheter 2 times per day   • enoxaparin  40 mg Subcutaneous Daily   • aspirin  325 mg Oral Once   • divalproex  1,500 mg Oral QAM   • insulin lispro   Subcutaneous 4x Daily AC & HS   • ascorbic acid  500 mg Oral 4x Daily   • zinc sulfate  220 mg Oral Daily with breakfast       • sodium chloride 0.9% infusion 100 mL/hr at 08/15/20 0619   • dextrose 5 % infusion         sodium chloride, sodium chloride, sodium chloride, potassium CHLORIDE, potassium CHLORIDE, potassium  CHLORIDE 20 mEq/100mL IVPB, potassium CHLORIDE, potassium CHLORIDE, potassium CHLORIDE 20 mEq/100mL IVPB, magnesium sulfate, magnesium sulfate, magnesium sulfate, dextrose, dextrose, dextrose, glucagon, dextrose, dextrose      Physical Exam:   General appearance: alert, appears stated age, cooperative and no distress  Head: Normocephalic, without obvious abnormality  Neck: no adenopathy and no carotid bruit  Lungs: clear to auscultation bilaterally  Chest wall: no tenderness  Heart: regular rate and rhythm, S1, S2 normal, no murmur, click, rub or gallop  Abdomen: soft, non-tender; bowel sounds normal; no masses,  no organomegaly  Extremities: no edema, redness or tenderness in the calves or thighs  Pulses: +1/+1  Neurologic: Alert and oriented X 3, normal strength and tone. Normal symmetric reflexes. Normal coordination and gait    Laboratory Results:  Recent Labs   Lab 08/15/20  0236 08/14/20  1928 08/14/20  1218 08/14/20  0746 08/14/20  0217   WBC 7.7  --   --  8.7 8.1   HCT 49.6  --   --  46.4 47.7   HGB 15.9  --   --  15.0 15.5     --   --  200 204   SODIUM 137  --   --  135 135   POTASSIUM 4.1  --   --  3.6 5.5*   CHLORIDE 104  --   --  101 102   CO2 25  --   --  24 25   GLUCOSE 183*  --   --  172* 151*   BUN 9  --   --  12 11   CREATININE 0.89  --   --  0.90 0.93   RAPDTR 0.09* 0.08* 0.08* 0.08* 0.08*       Assessment:  1. Atypical chest pain  2. Elevated troponin  3. Paroxysmal atrial fibrillation- currently in sinus rhythm- on flecainide  4. Bentley parkinson White  5. Hypertension  6. Hyperlipidemia- on statin  7. Type 2 diabetes mellitus  8. Obstructive sleep apnea- on CPAP  9. hypomagnesemia  ------------------------------------------------------------------------------------------  Recommendations:  - Patient seen examined, troponin level is mildly elevated  - Recommend stress test given chest pain, elevation troponin    Thank you for allowing me to participate in Jassi Marr's care. Please call  with any concerns or questions.      DO Renea Whitley [facility]     Yes, Full Record

## 2020-08-17 ENCOUNTER — ASOB RESULT (OUTPATIENT)
Age: 45
End: 2020-08-17

## 2020-08-17 ENCOUNTER — APPOINTMENT (OUTPATIENT)
Dept: ANTEPARTUM | Facility: CLINIC | Age: 45
End: 2020-08-17
Payer: SELF-PAY

## 2020-08-17 PROCEDURE — 76856 US EXAM PELVIC COMPLETE: CPT | Mod: 59

## 2020-08-17 PROCEDURE — 76830 TRANSVAGINAL US NON-OB: CPT | Mod: 59

## 2020-09-28 ENCOUNTER — OUTPATIENT (OUTPATIENT)
Dept: OUTPATIENT SERVICES | Facility: HOSPITAL | Age: 45
LOS: 1 days | End: 2020-09-28
Payer: COMMERCIAL

## 2020-09-28 ENCOUNTER — APPOINTMENT (OUTPATIENT)
Dept: MAMMOGRAPHY | Facility: CLINIC | Age: 45
End: 2020-09-28
Payer: COMMERCIAL

## 2020-09-28 ENCOUNTER — APPOINTMENT (OUTPATIENT)
Dept: ULTRASOUND IMAGING | Facility: CLINIC | Age: 45
End: 2020-09-28
Payer: COMMERCIAL

## 2020-09-28 DIAGNOSIS — R92.8 OTHER ABNORMAL AND INCONCLUSIVE FINDINGS ON DIAGNOSTIC IMAGING OF BREAST: ICD-10-CM

## 2020-09-28 PROCEDURE — 76642 ULTRASOUND BREAST LIMITED: CPT

## 2020-09-28 PROCEDURE — 76642 ULTRASOUND BREAST LIMITED: CPT | Mod: 26,RT

## 2021-12-12 NOTE — OB NEONATOLOGY/PEDIATRICIAN DELIVERY SUMMARY - BABY A: APGAR 1 MIN SCORE, DELIVERY
I have reviewed discharge instructions with the patient. The patient verbalized understanding. Patient left ED via Discharge Method: ambulatory to Home with self. Opportunity for questions and clarification provided. Patient given 0 scripts. To continue your aftercare when you leave the hospital, you may receive an automated call from our care team to check in on how you are doing. This is a free service and part of our promise to provide the best care and service to meet your aftercare needs.  If you have questions, or wish to unsubscribe from this service please call [phone]. Thank you for Choosing our [facility]. 8

## 2022-01-25 NOTE — OB RN PATIENT PROFILE - BREAST MILK SUPPORTS STABLE NEWBORN BLOOD SUGAR
Patient discharged to shelter  Patient escorted to lobby  All discharge paperwork reviewed  Patient denies SI/HI  Patient discharged with all belongings  Statement Selected

## 2022-05-17 NOTE — OB RN TRIAGE NOTE - NS_LMP_OBGYN_ALL_OB_DT
I have seen Brett Emmanuel and discussed his case with resident Matt Lloyd DO.  I have also reviewed the note as documented by Matt Lloyd DO and agree with it.   Allergies, radiology reports, labs and other clinical notes reviewed.   The following issues were addressed.  RN visit for updating medications, he will bring them in. ALTAGRACIA Dubois MA   Please see note below for further details.      12-Oct-2018

## 2023-03-30 ENCOUNTER — APPOINTMENT (OUTPATIENT)
Dept: GASTROENTEROLOGY | Facility: CLINIC | Age: 48
End: 2023-03-30
Payer: MEDICAID

## 2023-03-30 VITALS
HEIGHT: 65 IN | BODY MASS INDEX: 32.82 KG/M2 | OXYGEN SATURATION: 99 % | WEIGHT: 197 LBS | DIASTOLIC BLOOD PRESSURE: 82 MMHG | HEART RATE: 79 BPM | SYSTOLIC BLOOD PRESSURE: 124 MMHG | RESPIRATION RATE: 16 BRPM | TEMPERATURE: 96.9 F

## 2023-03-30 DIAGNOSIS — K59.00 CONSTIPATION, UNSPECIFIED: ICD-10-CM

## 2023-03-30 DIAGNOSIS — Z12.11 ENCOUNTER FOR SCREENING FOR MALIGNANT NEOPLASM OF COLON: ICD-10-CM

## 2023-03-30 DIAGNOSIS — K21.9 GASTRO-ESOPHAGEAL REFLUX DISEASE W/OUT ESOPHAGITIS: ICD-10-CM

## 2023-03-30 DIAGNOSIS — Z78.9 OTHER SPECIFIED HEALTH STATUS: ICD-10-CM

## 2023-03-30 DIAGNOSIS — Z80.0 FAMILY HISTORY OF MALIGNANT NEOPLASM OF DIGESTIVE ORGANS: ICD-10-CM

## 2023-03-30 DIAGNOSIS — Z86.79 PERSONAL HISTORY OF OTHER DISEASES OF THE CIRCULATORY SYSTEM: ICD-10-CM

## 2023-03-30 PROCEDURE — 99204 OFFICE O/P NEW MOD 45 MIN: CPT

## 2023-03-30 PROCEDURE — T1013A: CUSTOM

## 2023-03-30 RX ORDER — ESOMEPRAZOLE MAGNESIUM 20 MG/1
20 GRANULE, DELAYED RELEASE ORAL
Refills: 0 | Status: ACTIVE | COMMUNITY

## 2023-03-30 RX ORDER — LOSARTAN POTASSIUM 100 MG/1
TABLET, FILM COATED ORAL
Refills: 0 | Status: ACTIVE | COMMUNITY

## 2023-03-30 RX ORDER — FAMOTIDINE 20 MG/1
20 TABLET, FILM COATED ORAL
Refills: 0 | Status: ACTIVE | COMMUNITY

## 2023-03-30 NOTE — PHYSICAL EXAM
[Alert] : alert [Normal Voice/Communication] : normal voice/communication [Healthy Appearing] : healthy appearing [No Acute Distress] : no acute distress [Sclera] : the sclera and conjunctiva were normal [Hearing Threshold Finger Rub Not [place]] : hearing was normal [Normal Lips/Gums] : the lips and gums were normal [Oropharynx] : the oropharynx was normal [Normal Appearance] : the appearance of the neck was normal [No Neck Mass] : no neck mass was observed [No Respiratory Distress] : no respiratory distress [No Acc Muscle Use] : no accessory muscle use [Respiration, Rhythm And Depth] : normal respiratory rhythm and effort [Auscultation Breath Sounds / Voice Sounds] : lungs were clear to auscultation bilaterally [Heart Rate And Rhythm] : heart rate was normal and rhythm regular [Normal S1, S2] : normal S1 and S2 [Murmurs] : no murmurs [Bowel Sounds] : normal bowel sounds [Abdomen Tenderness] : non-tender [No Masses] : no abdominal mass palpated [Abdomen Soft] : soft [] : no hepatosplenomegaly [Oriented To Time, Place, And Person] : oriented to person, place, and time

## 2023-05-16 NOTE — HISTORY OF PRESENT ILLNESS
[FreeTextEntry1] : Patient is a 48 year old female, with PMH of HTN, who presents for chronic acid reflux, chronic constipation and colon cancer screening. \par \par Pt has acid reflux almost daily x 10 years. She currently takes Nexium 20mg PO QD and Famotidine 20mg QHS with good relief and good control of her symptoms. She admits to flare up of symptoms if she misses a dose. She was previously on Omeprazole and Lansoprazole in the past but finds Nexium to work the best for her. She has never had an EGD. Patient's father has stomach cancer and is in the process of being worked up. \par \par Pt also has chronic constipation. BMs usually QOD but sometimes every 3rd day. She takes a laxative prescribed by PCP if no BM x 3 days. No rectal bleeding. She denies a family h/o colon cancer and she has never had a colonoscopy in the past. \par \par Patient denies any significant cardiac or pulmonary conditions. \par \par Patient denies dysphagia, abdominal pain, rectal bleeding, nausea, vomiting, or unexplained weight loss.\par

## 2023-05-16 NOTE — END OF VISIT
[FreeTextEntry3] : Patient to our physician assistant I also performed a history and physical exam and agree with the assessment and plans

## 2023-05-16 NOTE — ASSESSMENT
[FreeTextEntry1] : Patient is a 48 year old female, with PMH of HTN, who presents for chronic acid reflux, chronic constipation and colon cancer screening. She has a family h/o stomach cancer in her father. \par \par Acid reflux, continue Nexium 20mg PO QD and Famotidine 20mg PO QHS. \par EGD to be scheduled to r/o gastritis, esophagitis, Abreu's Esophagus, or PUD. Indication, risks and benefit of EGD discussed with patient in detail. All questions answered. Patient is medically optimized for EGD.\par \par Chronic constipation, improves with high fiber diet/increased fruit/veggie intake. Can add fiber supplement\par \par Colon cancer screening\par Colonoscopy to be scheduled. I have discussed the indications, risks and benefits of procedure with patient. Risks include, but not limited to, bleeding, perforation, infection, and reaction to anesthesia. Alternatives to colonoscopy discussed with patient. Patient was given the opportunity to ask questions, all questions were answered. The patient agrees to proceed with colonoscopy. Patient is medically optimized for colonoscopy. \par \par Gavilyte prep to be used. Bowel prep instructions discussed at length. \par \par CBC/BMP, PT/INR along with celiac panel ordered prior to procedure\par

## 2023-05-16 NOTE — REASON FOR VISIT
[Consultation] : a consultation visit [[employer] ] : provided by [employer]   [Time Spent: ____ minutes] : Total time spent using  services: [unfilled] minutes. The patient's primary language is not English thus required  services. [Interpreters_IDNumber] : [id number] [FreeTextEntry1] : acid reflux, chronic constipation and colon cancer screening  [Interpreters_FullName] : Reema [TWNoteComboBox1] : [place]

## 2023-05-31 ENCOUNTER — TRANSCRIPTION ENCOUNTER (OUTPATIENT)
Age: 48
End: 2023-05-31

## 2023-05-31 ENCOUNTER — RESULT REVIEW (OUTPATIENT)
Age: 48
End: 2023-05-31

## 2023-05-31 ENCOUNTER — OUTPATIENT (OUTPATIENT)
Dept: OUTPATIENT SERVICES | Facility: HOSPITAL | Age: 48
LOS: 1 days | End: 2023-05-31
Payer: COMMERCIAL

## 2023-05-31 ENCOUNTER — APPOINTMENT (OUTPATIENT)
Dept: GASTROENTEROLOGY | Facility: HOSPITAL | Age: 48
End: 2023-05-31

## 2023-05-31 DIAGNOSIS — K59.00 CONSTIPATION, UNSPECIFIED: ICD-10-CM

## 2023-05-31 DIAGNOSIS — K21.9 GASTRO-ESOPHAGEAL REFLUX DISEASE WITHOUT ESOPHAGITIS: ICD-10-CM

## 2023-05-31 PROCEDURE — 45385 COLONOSCOPY W/LESION REMOVAL: CPT

## 2023-05-31 PROCEDURE — 88342 IMHCHEM/IMCYTCHM 1ST ANTB: CPT

## 2023-05-31 PROCEDURE — 88342 IMHCHEM/IMCYTCHM 1ST ANTB: CPT | Mod: 26

## 2023-05-31 PROCEDURE — 43239 EGD BIOPSY SINGLE/MULTIPLE: CPT

## 2023-05-31 PROCEDURE — 43239 EGD BIOPSY SINGLE/MULTIPLE: CPT | Mod: 59

## 2023-05-31 PROCEDURE — 88305 TISSUE EXAM BY PATHOLOGIST: CPT | Mod: 26

## 2023-05-31 PROCEDURE — 45380 COLONOSCOPY AND BIOPSY: CPT | Mod: PT

## 2023-05-31 PROCEDURE — 88305 TISSUE EXAM BY PATHOLOGIST: CPT

## 2023-05-31 DEVICE — NAIL OSTEO 1.5X16MM STRL: Type: IMPLANTABLE DEVICE | Status: FUNCTIONAL

## 2023-05-31 NOTE — ASSESSMENT
[FreeTextEntry1] : A/P\par gerd\par \par  I discussed the risks and benefits of EGD and patient was given opportunity to ask questions. EGD to r/o Abreu's  esophagus, PUD, mass, AVM'S. Pt is medically optimized for EGD\par \par \par screening colon\par  I discussed the risks and benefits of colonoscopy and patient was given opportunity to ask questions. Colonoscopy to r/o colon cancer, polyps, AVM's. Patient is medically optimized for the procedure\par '

## 2023-06-05 LAB — SURGICAL PATHOLOGY STUDY: SIGNIFICANT CHANGE UP

## 2023-09-07 ENCOUNTER — APPOINTMENT (OUTPATIENT)
Dept: GASTROENTEROLOGY | Facility: CLINIC | Age: 48
End: 2023-09-07
Payer: MEDICAID

## 2023-09-07 VITALS
RESPIRATION RATE: 14 BRPM | BODY MASS INDEX: 33.82 KG/M2 | SYSTOLIC BLOOD PRESSURE: 134 MMHG | HEART RATE: 83 BPM | DIASTOLIC BLOOD PRESSURE: 89 MMHG | OXYGEN SATURATION: 98 % | WEIGHT: 203 LBS | HEIGHT: 65 IN

## 2023-09-07 DIAGNOSIS — Z09 ENCOUNTER FOR FOLLOW-UP EXAMINATION AFTER COMPLETED TREATMENT FOR CONDITIONS OTHER THAN MALIGNANT NEOPLASM: ICD-10-CM

## 2023-09-07 PROCEDURE — 99214 OFFICE O/P EST MOD 30 MIN: CPT

## 2023-09-07 RX ORDER — FAMOTIDINE 20 MG/1
20 TABLET, FILM COATED ORAL DAILY
Qty: 90 | Refills: 3 | Status: ACTIVE | COMMUNITY
Start: 2023-09-07 | End: 1900-01-01

## 2023-09-07 RX ORDER — POLYETHYLENE GLYCOL-3350 AND ELECTROLYTES WITH FLAVOR PACK 240; 5.84; 2.98; 6.72; 22.72 G/278.26G; G/278.26G; G/278.26G; G/278.26G; G/278.26G
240 POWDER, FOR SOLUTION ORAL
Qty: 1 | Refills: 0 | Status: DISCONTINUED | COMMUNITY
Start: 2023-03-30 | End: 2023-09-07

## 2023-09-07 NOTE — HISTORY OF PRESENT ILLNESS
[FreeTextEntry1] : Zaria was the .  Patient with a history of hypertension.  GERD x10 years, constipation.  Family history of stomach cancer  Patient with a history of GERD for 10 years.  No response to omeprazole or Prevacid.  Symptoms are currently controlled with Nexium 20 mg in the a.m. (OTC), Pepcid 20 mg in the evening.  She currently has no heartburn or regurgitation.  EGD in 2023 showed hiatal hernia, fundic gland polyps.  Biopsies negative for H. pylori  Patient has mild constipation has a bowel movement every other day.  Better with increased fiber.  Colonoscopy 2023 showed a 5 mm tubular adenoma and internal hemorrhoids.

## 2023-09-07 NOTE — ASSESSMENT
[FreeTextEntry1] : A/P gerd Today's instructions for acid reflux include avoid provocative foods. For example citrus alcohol coffee chocolate mints. Smaller meals, no eating 3 hours prior to bedtime and elevate head of the bed prior to sleep. I wrote a prescription for Pepcid nightly.  Patient gets Nexium 20 mg OTC  History of colon polyps–due for colonoscopy 2028  Constipation–high-fiber diet  Follow-up 6 months

## 2024-06-04 PROBLEM — O24.419 GESTATIONAL DIABETES MELLITUS IN PREGNANCY, UNSPECIFIED CONTROL: Chronic | Status: ACTIVE | Noted: 2019-06-25

## 2024-06-13 ENCOUNTER — APPOINTMENT (OUTPATIENT)
Dept: ULTRASOUND IMAGING | Facility: CLINIC | Age: 49
End: 2024-06-13

## 2024-06-13 ENCOUNTER — OUTPATIENT (OUTPATIENT)
Dept: OUTPATIENT SERVICES | Facility: HOSPITAL | Age: 49
LOS: 1 days | End: 2024-06-13
Payer: COMMERCIAL

## 2024-06-13 ENCOUNTER — APPOINTMENT (OUTPATIENT)
Dept: MAMMOGRAPHY | Facility: CLINIC | Age: 49
End: 2024-06-13

## 2024-06-13 DIAGNOSIS — R92.8 OTHER ABNORMAL AND INCONCLUSIVE FINDINGS ON DIAGNOSTIC IMAGING OF BREAST: ICD-10-CM

## 2024-06-13 PROCEDURE — G0279: CPT | Mod: 26

## 2024-06-13 PROCEDURE — 76642 ULTRASOUND BREAST LIMITED: CPT | Mod: 26,RT

## 2024-06-13 PROCEDURE — 77065 DX MAMMO INCL CAD UNI: CPT | Mod: 26,RT

## 2024-06-13 PROCEDURE — 76642 ULTRASOUND BREAST LIMITED: CPT

## 2024-06-13 PROCEDURE — 77065 DX MAMMO INCL CAD UNI: CPT

## 2024-06-13 PROCEDURE — G0279: CPT
